# Patient Record
Sex: FEMALE
[De-identification: names, ages, dates, MRNs, and addresses within clinical notes are randomized per-mention and may not be internally consistent; named-entity substitution may affect disease eponyms.]

---

## 2017-07-21 ENCOUNTER — HOSPITAL ENCOUNTER (INPATIENT)
Dept: HOSPITAL 42 - MAMSURG | Age: 67
LOS: 3 days | Discharge: HOME | DRG: 581 | End: 2017-07-24
Attending: GENERAL PRACTICE | Admitting: GENERAL PRACTICE
Payer: MEDICARE

## 2017-07-21 VITALS — BODY MASS INDEX: 44.9 KG/M2

## 2017-07-21 DIAGNOSIS — N60.41: ICD-10-CM

## 2017-07-21 DIAGNOSIS — N64.1: ICD-10-CM

## 2017-07-21 DIAGNOSIS — Z85.3: ICD-10-CM

## 2017-07-21 DIAGNOSIS — E66.9: ICD-10-CM

## 2017-07-21 DIAGNOSIS — G89.18: ICD-10-CM

## 2017-07-21 DIAGNOSIS — I10: ICD-10-CM

## 2017-07-21 DIAGNOSIS — J45.909: ICD-10-CM

## 2017-07-21 DIAGNOSIS — Z90.12: ICD-10-CM

## 2017-07-21 DIAGNOSIS — N60.31: ICD-10-CM

## 2017-07-21 DIAGNOSIS — E80.6: ICD-10-CM

## 2017-07-21 DIAGNOSIS — E11.9: ICD-10-CM

## 2017-07-21 DIAGNOSIS — N60.21: Primary | ICD-10-CM

## 2017-07-21 DIAGNOSIS — N60.91: ICD-10-CM

## 2017-07-21 DIAGNOSIS — Z92.21: ICD-10-CM

## 2017-07-21 DIAGNOSIS — N61.0: ICD-10-CM

## 2017-07-21 DIAGNOSIS — N60.81: ICD-10-CM

## 2017-07-21 DIAGNOSIS — I49.9: ICD-10-CM

## 2017-07-21 LAB
APTT BLD: 28.1 SECONDS (ref 23.7–30.8)
INR PPP: 1.08 (ref 0.93–1.08)
PROTHROMBIN TIME: 11.7 SECONDS (ref 9.9–11.8)

## 2017-07-21 PROCEDURE — 0HTT0ZZ RESECTION OF RIGHT BREAST, OPEN APPROACH: ICD-10-PCS | Performed by: GENERAL PRACTICE

## 2017-07-21 PROCEDURE — 07B50ZX EXCISION OF RIGHT AXILLARY LYMPHATIC, OPEN APPROACH, DIAGNOSTIC: ICD-10-PCS | Performed by: GENERAL PRACTICE

## 2017-07-21 PROCEDURE — BH40ZZZ ULTRASONOGRAPHY OF RIGHT BREAST: ICD-10-PCS | Performed by: GENERAL PRACTICE

## 2017-07-21 NOTE — PCM.SURG1
Surgeon's Initial Post Op Note





- Surgeon's Notes


Surgeon: Dr. Porras


Assistant: Dr. Pedersen PGY3; Dr. Lorenzo PGY2; Francine Bruner OMS3


Type of Anesthesia: General Endo


Anesthesia Administered By: Maggy


Pre-Operative Diagnosis: Right Breast mass


Operative Findings: same


Post-Operative Diagnosis: same


Operation Performed: Right Simple Mastectomy with needle localization.  

Excision of Axillary lymph nodes


Specimen/Specimens Removed: Right Breast


Estimated Blood Loss: EBL {In ML}: 30


Blood Products Given: N/A


Drains Used: Skyler


Post-Op Condition: Good


Date of Surgery/Procedure: 07/21/17


Time of Surgery/Procedure: 14:10

## 2017-07-22 VITALS — RESPIRATION RATE: 20 BRPM

## 2017-07-22 RX ADMIN — INSULIN HUMAN SCH UNITS: 100 INJECTION, SOLUTION PARENTERAL at 01:54

## 2017-07-22 RX ADMIN — INSULIN HUMAN SCH UNITS: 100 INJECTION, SOLUTION PARENTERAL at 17:14

## 2017-07-22 RX ADMIN — INSULIN HUMAN SCH: 100 INJECTION, SOLUTION PARENTERAL at 21:53

## 2017-07-22 RX ADMIN — DILTIAZEM HYDROCHLORIDE SCH MG: 180 CAPSULE, EXTENDED RELEASE ORAL at 09:05

## 2017-07-22 RX ADMIN — INSULIN HUMAN SCH UNITS: 100 INJECTION, SOLUTION PARENTERAL at 12:39

## 2017-07-22 RX ADMIN — INSULIN HUMAN SCH UNITS: 100 INJECTION, SOLUTION PARENTERAL at 07:49

## 2017-07-22 RX ADMIN — HYDROMORPHONE HYDROCHLORIDE PRN MG: 1 INJECTION, SOLUTION INTRAMUSCULAR; INTRAVENOUS; SUBCUTANEOUS at 21:00

## 2017-07-22 RX ADMIN — HYDROMORPHONE HYDROCHLORIDE PRN MG: 1 INJECTION, SOLUTION INTRAMUSCULAR; INTRAVENOUS; SUBCUTANEOUS at 07:40

## 2017-07-22 RX ADMIN — DIGOXIN SCH MG: 0.25 TABLET ORAL at 09:05

## 2017-07-22 NOTE — CP.PCM.PN
<Grant Lorenzo - Last Filed: 07/22/17 12:17>





Subjective





- Date & Time of Evaluation


Date of Evaluation: 07/22/17


Time of Evaluation: 09:10





- Subjective


Subjective: 





SURGERY NOTE FOR DR. PORRAS





67F seen and examined at bedside. Patient complain of pain at site of 

mastectomy. IV pain medication helps with pain. Tolerating diet. 





Objective





- Vital Signs/Intake and Output


Vital Signs (last 24 hours): 


 











Temp Pulse Resp BP Pulse Ox


 


 97.6 F   90   17   152/100 H  87 L


 


 07/22/17 06:00  07/22/17 09:05  07/22/17 06:00  07/22/17 09:05  07/22/17 06:00








Intake and Output: 


 











 07/22/17 07/22/17





 06:59 18:59


 


Intake Total 400 


 


Output Total 275 


 


Balance 125 














- Medications


Medications: 


 Current Medications





Atorvastatin Calcium (Lipitor)  10 mg PO DIN [facility]


   Last Admin: 07/21/17 17:59 Dose:  10 mg


Digoxin (Lanoxin)  0.25 mg PO DAILY [facility]


   Last Admin: 07/22/17 09:05 Dose:  0.25 mg


Diltiazem HCl (Cardizem Cd)  180 mg PO DAILY [facility]


   Last Admin: 07/22/17 09:05 Dose:  180 mg


Famotidine (Pepcid)  20 mg PO DAILY [facility]


   Last Admin: 07/22/17 10:24 Dose:  20 mg


Furosemide (Lasix)  20 mg PO BID [facility]


   Last Admin: 07/22/17 09:05 Dose:  20 mg


Home Med (Home Med)  1 unit PO TUE [facility]


Home Med (Home Med)  1 unit PO DAILY [facility]


   Last Admin: 07/22/17 09:15 Dose:  Not Given


Hydromorphone HCl (Dilaudid)  1 mg SC Q4H PRN


   PRN Reason: Pain, severe (8-10)


   Last Admin: 07/22/17 07:40 Dose:  1 mg


Insulin Human Regular (Humulin R Low)  0 units SC ACHS [facility]


   PRN Reason: Protocol


   Last Admin: 07/22/17 07:49 Dose:  3 units


Losartan Potassium (Cozaar)  100 mg PO DAILY [facility]


   Last Admin: 07/22/17 09:05 Dose:  100 mg


Metformin HCl (Glucophage Xr)  750 mg PO DAILY [facility]


   Last Admin: 07/22/17 09:05 Dose:  750 mg


Non-Formulary Medication (Clonazepam [Clonazepam])  1 mg PO Q6H PRN


   PRN Reason: Anxiety


Non-Formulary Medication (Mometasone/Formoterol [Dulera 200 Mcg/5 Mcg Inhaler])

  1 michelle IH PRN PRN


   PRN Reason: Wheezing


Non-Formulary Medication (Fenofibric Acid (Choline) [Fenofibric Acid])  135 mg 

PO DAILY [facility]


   Last Admin: 07/22/17 09:15 Dose:  Not Given


Ondansetron HCl (Zofran Tab)  4 mg PO Q6H PRN


   PRN Reason: Nausea/Vomiting


   Last Admin: 07/22/17 08:56 Dose:  4 mg











- Labs


Labs: 


 











PT  11.7 Seconds (9.9-11.8)   07/21/17  07:19    


 


INR  1.08  (0.93-1.08)   07/21/17  07:19    


 


APTT  28.1 Seconds (23.7-30.8)   07/21/17  07:19    














- Constitutional


Appears: Non-toxic, No Acute Distress





- Head Exam


Head Exam: ATRAUMATIC





- Respiratory Exam


Respiratory Exam: Clear to Ausculation Bilateral, NORMAL BREATHING PATTERN





- Cardiovascular Exam


Cardiovascular Exam: REGULAR RHYTHM, +S1, +S2





- Neurological Exam


Neurological Exam: Alert, Awake





- Additional Findings


Additional findings: 





breast exam:


R/L mastectomy


s/p right mastectomy POD1


Staples in place - no signs of infection


- YOLANDA drain - 15cc serosang overnight





Assessment and Plan





- Assessment and Plan (Free Text)


Assessment: 





67F Right Mastectomy and lymph node excision POD1


Plan: 





- Pain control


- Monitor incision site


- Monitor YOLANDA output


- CBC/BMP tomorrow


- GI/DVT ppx





Discussed with Dr. Son Lorenzo, PGY2





<Demetrius Porras - Last Filed: 07/24/17 00:40>





Objective





- Vital Signs/Intake and Output


Vital Signs (last 24 hours): 


 











Temp Pulse Resp BP Pulse Ox


 


 98.0 F   70   20   135/76   90 L


 


 07/23/17 16:00  07/23/17 16:00  07/23/17 16:00  07/23/17 17:07  07/23/17 16:00








Intake and Output: 


 











 07/23/17 07/24/17





 18:59 06:59


 


Intake Total 240 480


 


Output Total  500


 


Balance 240 -20














- Medications


Medications: 


 Current Medications





Atorvastatin Calcium (Lipitor)  10 mg PO DIN [facility]


   Last Admin: 07/23/17 17:07 Dose:  10 mg


Digoxin (Lanoxin)  0.25 mg PO DAILY [facility]


   Last Admin: 07/23/17 09:38 Dose:  0.25 mg


Diltiazem HCl (Cardizem Cd)  180 mg PO DAILY [facility]


   Last Admin: 07/23/17 09:37 Dose:  180 mg


Famotidine (Pepcid)  20 mg PO DAILY [facility]


   Last Admin: 07/23/17 09:37 Dose:  20 mg


Furosemide (Lasix)  20 mg PO BID [facility]


   Last Admin: 07/23/17 17:07 Dose:  20 mg


Home Med (Home Med)  1 unit PO TUE [facility]


Home Med (Home Med)  1 unit PO DAILY [facility]


   Last Admin: 07/23/17 12:34 Dose:  Not Given


Hydromorphone HCl (Dilaudid)  1 mg SC Q4H PRN


   PRN Reason: Pain, severe (8-10)


   Last Admin: 07/23/17 13:32 Dose:  1 mg


Insulin Human Regular (Humulin R Low)  0 units SC [facility]


   PRN Reason: Protocol


   Last Admin: 07/23/17 21:29 Dose:  Not Given


Losartan Potassium (Cozaar)  100 mg PO DAILY [facility]


   Last Admin: 07/23/17 09:38 Dose:  100 mg


Metformin HCl (Glucophage Xr)  750 mg PO DAILY [facility]


   Last Admin: 07/23/17 09:37 Dose:  750 mg


Non-Formulary Medication (Clonazepam [Clonazepam])  1 mg PO Q6H PRN


   PRN Reason: Anxiety


Non-Formulary Medication (Mometasone/Formoterol [Dulera 200 Mcg/5 Mcg Inhaler])

  1 michelle IH PRN PRN


   PRN Reason: Wheezing


Non-Formulary Medication (Fenofibric Acid (Choline) [Fenofibric Acid])  135 mg 

PO DAILY [facility]


   Last Admin: 07/23/17 12:34 Dose:  Not Given


Ondansetron HCl (Zofran Tab)  4 mg PO Q6H PRN


   PRN Reason: Nausea/Vomiting


   Last Admin: 07/23/17 13:31 Dose:  4 mg











- Labs


Labs: 


 





 07/23/17 09:03 





 07/23/17 09:03 





 











PT  11.7 Seconds (9.9-11.8)   07/21/17  07:19    


 


INR  1.08  (0.93-1.08)   07/21/17  07:19    


 


APTT  28.1 Seconds (23.7-30.8)   07/21/17  07:19    














Assessment and Plan





- Assessment and Plan (Free Text)


Plan: 


Patient was seen and examined by me. I agree with assessment and plan as per 

resident's note.

## 2017-07-22 NOTE — OP
PROCEDURE DATE:  07/21/2017



PREOPERATIVE DIAGNOSES:

1.  Right breast abnormal lesion.

2.  History of left breast cancer.



POSTOPERATIVE DIAGNOSES:

1.  Right breast abnormal lesion.

2.  History of left breast cancer.



PROCEDURE:  Needle localization of the breast lesion and right modified radical

mastectomy.



SURGEON:  Dr. Demetrius Porras MD



ASSISTANT:  Dr. Pedersen and Dr. Morales.



TYPE OF ANESTHESIA:  General endotracheal anesthesia.



ANESTHESIA ADMINISTERED BY:  Dr. Savage.



ESTIMATED BLOOD LOSS:  Minimal.



SPECIMEN:  Right breast and right axillary content.



INDICATIONS:  The patient is a 67-year-old female who has a history of

extensive breast cancer of left breast several years ago treated with

chemotherapy, but subsequently the patient developed a lesion in the right

breast for which she was going through testing over the past few months. 

Last biopsy showed abnormalities that were suspected for cancer.  Given

these findings and the previous history and the patient's insistence to

have complete mastectomy due to several reasons one of them being to have

any possibility of cancer within it.  The second one was due to the weight

of her breast which was just a right breast after previous left mastectomy

cause her significant back pain and discomfort, therefore a decision was

made to proceed with modified radical mastectomy in order to remove the

entire breast and some of the lymphatic content of the axilla.



DESCRIPTION OF PROCEDURE:  The patient was brought to the operating room,

placed on operating table in supine position.  The patient was connected to

the EKG, blood pressure, and pulse oximetry monitors.  The patient then

underwent general endotracheal anesthesia and was prepped and draped in

usual sterile fashion.



First a standard time-out procedure took place with everybody in the room

agreed as to the patient's identity, diagnoses, and procedure to be

performed.



First the right breast was marked in order to create adequate flap of skin

in order to close the wound.  Once this was done, I then proceeded with

making an incision, first the superior one and flap was raised all the way

up to the clavicle.  The underlying breast tissue was carefully scooped

down along with the pectoralis major muscle.  In the similar fashion the

inferior flap was raised through the alvina's ligament to separate the

breast from the subcutaneous fat and went down all the way to almost the

ridge of the chest.  Once this was done, the breast was completely

mobilized all the way towards the axilla.  Once this was done, I then

proceeded with carrying through the fat incision over the axilla and

carefully dissected out the lymph nodes from the right axilla 

the fatty tissue of the lymph node containing tissue from the axillary

structure and left most of the nerves intact.  The long thoracic nerve was

identified and left intact.  The tissue was now marked and there was one

palpable lymph node within the axilla which was taken separately and sent

for a frozen section which came back as negative for cancer.  The breast

now was removed, highest axillary content was marked with a stitch and sent

for pathology.  Wound was now copiously irrigated with all the bleeding

points were cauterized.  There was excellent hemostasis.  The Skyler drain

was placed into the axilla and under the flaps.  The wound was closed using

3-0 Vicryl deep dermal stitches and surgical staples for skin.  Sterile

dressings were applied to the wound.  The patient tolerated the procedure

well and there were no complications.  The patient was awaken and extubated

and transferred to recovery room for further observations.





__________________________________________

Demetrius Porras MD





DD:  07/21/2017 14:34:23

DT:  07/21/2017 15:12:54

Job # [id number]





PIERO

## 2017-07-23 VITALS — OXYGEN SATURATION: 90 %

## 2017-07-23 LAB
BUN SERPL-MCNC: 20 MG/DL (ref 7–21)
CALCIUM SERPL-MCNC: 8.7 MG/DL (ref 8.4–10.5)
ERYTHROCYTE [DISTWIDTH] IN BLOOD BY AUTOMATED COUNT: 15.6 % (ref 11.5–14.5)
GFR NON-AFRICAN AMERICAN: > 60
HGB BLD-MCNC: 12.4 GM/DL (ref 12–16)
MCH RBC QN AUTO: 30 PG (ref 25–35)
MCHC RBC AUTO-ENTMCNC: 33.2 G/DL (ref 31–37)
MCV RBC AUTO: 90.1 FL (ref 80–105)
PLATELET # BLD: 197 10^3/UL (ref 120–450)
PMV BLD AUTO: 11.3 FL (ref 7–11)
RBC # BLD AUTO: 4.14 10^6/UL (ref 3.5–6.1)
WBC # BLD AUTO: 10.8 10^3/UL (ref 4.5–11)

## 2017-07-23 RX ADMIN — INSULIN HUMAN SCH UNITS: 100 INJECTION, SOLUTION PARENTERAL at 08:38

## 2017-07-23 RX ADMIN — DILTIAZEM HYDROCHLORIDE SCH MG: 180 CAPSULE, EXTENDED RELEASE ORAL at 09:37

## 2017-07-23 RX ADMIN — INSULIN HUMAN SCH UNITS: 100 INJECTION, SOLUTION PARENTERAL at 12:33

## 2017-07-23 RX ADMIN — INSULIN HUMAN SCH UNITS: 100 INJECTION, SOLUTION PARENTERAL at 17:07

## 2017-07-23 RX ADMIN — HYDROMORPHONE HYDROCHLORIDE PRN MG: 1 INJECTION, SOLUTION INTRAMUSCULAR; INTRAVENOUS; SUBCUTANEOUS at 13:32

## 2017-07-23 RX ADMIN — INSULIN HUMAN SCH: 100 INJECTION, SOLUTION PARENTERAL at 21:29

## 2017-07-23 RX ADMIN — DIGOXIN SCH MG: 0.25 TABLET ORAL at 09:38

## 2017-07-23 NOTE — CP.PCM.PN
<Grant Lorenzo - Last Filed: 07/23/17 06:30>





Subjective





- Date & Time of Evaluation


Date of Evaluation: 07/23/17


Time of Evaluation: 05:35





- Subjective


Subjective: 





SURGERY NOTE FOR DR. PORRAS





67F seen and examined at bedside. Patient resting comfortably. NAEON. Patient 

complaining of moderate pain in the right breast. YOLANDA drain in place. Bandage 

with serous fluid stain.





Objective





- Vital Signs/Intake and Output


Vital Signs (last 24 hours): 


 











Temp Pulse Resp BP Pulse Ox


 


 98 F   98 H  20   105/67   93 L


 


 07/22/17 18:00  07/22/17 18:00  07/22/17 18:00  07/22/17 18:00  07/22/17 18:00








Intake and Output: 


 











 07/22/17 07/23/17





 18:59 06:59


 


Intake Total  540


 


Output Total  815


 


Balance  -275














- Medications


Medications: 


 Current Medications





Atorvastatin Calcium (Lipitor)  10 mg PO DIN [facility]


   Last Admin: 07/22/17 17:15 Dose:  10 mg


Digoxin (Lanoxin)  0.25 mg PO DAILY [facility]


   Last Admin: 07/22/17 09:05 Dose:  0.25 mg


Diltiazem HCl (Cardizem Cd)  180 mg PO DAILY [facility]


   Last Admin: 07/22/17 09:05 Dose:  180 mg


Famotidine (Pepcid)  20 mg PO DAILY [facility]


   Last Admin: 07/22/17 10:24 Dose:  20 mg


Furosemide (Lasix)  20 mg PO BID [facility]


   Last Admin: 07/22/17 17:15 Dose:  20 mg


Home Med (Home Med)  1 unit PO TUE [facility]


Home Med (Home Med)  1 unit PO DAILY [facility]


   Last Admin: 07/22/17 09:15 Dose:  Not Given


Hydromorphone HCl (Dilaudid)  1 mg SC Q4H PRN


   PRN Reason: Pain, severe (8-10)


   Last Admin: 07/22/17 21:00 Dose:  1 mg


Insulin Human Regular (Humulin R Low)  0 units SC ACHS [facility]


   PRN Reason: Protocol


   Last Admin: 07/22/17 21:53 Dose:  Not Given


Losartan Potassium (Cozaar)  100 mg PO DAILY [facility]


   Last Admin: 07/22/17 09:05 Dose:  100 mg


Metformin HCl (Glucophage Xr)  750 mg PO DAILY [facility]


   Last Admin: 07/22/17 09:05 Dose:  750 mg


Non-Formulary Medication (Clonazepam [Clonazepam])  1 mg PO Q6H PRN


   PRN Reason: Anxiety


Non-Formulary Medication (Mometasone/Formoterol [Dulera 200 Mcg/5 Mcg Inhaler])

  1 michelle IH PRN PRN


   PRN Reason: Wheezing


Non-Formulary Medication (Fenofibric Acid (Choline) [Fenofibric Acid])  135 mg 

PO DAILY [facility]


   Last Admin: 07/22/17 09:15 Dose:  Not Given


Ondansetron HCl (Zofran Tab)  4 mg PO Q6H PRN


   PRN Reason: Nausea/Vomiting


   Last Admin: 07/22/17 08:56 Dose:  4 mg











- Labs


Labs: 


 











PT  11.7 Seconds (9.9-11.8)   07/21/17  07:19    


 


INR  1.08  (0.93-1.08)   07/21/17  07:19    


 


APTT  28.1 Seconds (23.7-30.8)   07/21/17  07:19    














- Constitutional


Appears: Non-toxic, No Acute Distress





- Respiratory Exam


Respiratory Exam: Clear to Ausculation Bilateral, NORMAL BREATHING PATTERN





- Cardiovascular Exam


Cardiovascular Exam: REGULAR RHYTHM, +S1, +S2





- GI/Abdominal Exam


GI & Abdominal Exam: Soft.  absent: Distended, Firm, Guarding, Rigid, Tenderness

, Rebound





- Neurological Exam


Neurological Exam: Alert, Awake





- Skin


Skin Exam: Dry, Intact, Normal Color, Warm


Additional comments: 





Breast exam- patient tender to touch on right breast. staples in place, serous 

fluid stains on the dressing


- YOLANDA drain - 40 cc serous fluid output.





Assessment and Plan





- Assessment and Plan (Free Text)


Assessment: 





67F Right Mastectomy and lymph node excision POD2


Plan: 





- needs inpatient IV Pain control


- Monitor incision site


- Monitor YOLANDA output


- CBC/BMP this AM


- GI/DVT ppx





Discussed with Dr. Son Lorenzo, PGY2








<Demetrius Porras - Last Filed: 07/24/17 00:41>





Objective





- Vital Signs/Intake and Output


Vital Signs (last 24 hours): 


 











Temp Pulse Resp BP Pulse Ox


 


 98.0 F   70   20   135/76   90 L


 


 07/23/17 16:00  07/23/17 16:00  07/23/17 16:00  07/23/17 17:07  07/23/17 16:00








Intake and Output: 


 











 07/23/17 07/24/17





 18:59 06:59


 


Intake Total 240 480


 


Output Total  500


 


Balance 240 -20














- Medications


Medications: 


 Current Medications





Atorvastatin Calcium (Lipitor)  10 mg PO DIN [facility]


   Last Admin: 07/23/17 17:07 Dose:  10 mg


Digoxin (Lanoxin)  0.25 mg PO DAILY [facility]


   Last Admin: 07/23/17 09:38 Dose:  0.25 mg


Diltiazem HCl (Cardizem Cd)  180 mg PO DAILY [facility]


   Last Admin: 07/23/17 09:37 Dose:  180 mg


Famotidine (Pepcid)  20 mg PO DAILY [facility]


   Last Admin: 07/23/17 09:37 Dose:  20 mg


Furosemide (Lasix)  20 mg PO BID [facility]


   Last Admin: 07/23/17 17:07 Dose:  20 mg


Home Med (Home Med)  1 unit PO TUE [facility]


Home Med (Home Med)  1 unit PO DAILY [facility]


   Last Admin: 07/23/17 12:34 Dose:  Not Given


Hydromorphone HCl (Dilaudid)  1 mg SC Q4H PRN


   PRN Reason: Pain, severe (8-10)


   Last Admin: 07/23/17 13:32 Dose:  1 mg


Insulin Human Regular (Humulin R Low)  0 units SC ACHS [facility]


   PRN Reason: Protocol


   Last Admin: 07/23/17 21:29 Dose:  Not Given


Losartan Potassium (Cozaar)  100 mg PO DAILY [facility]


   Last Admin: 07/23/17 09:38 Dose:  100 mg


Metformin HCl (Glucophage Xr)  750 mg PO DAILY [facility]


   Last Admin: 07/23/17 09:37 Dose:  750 mg


Non-Formulary Medication (Clonazepam [Clonazepam])  1 mg PO Q6H PRN


   PRN Reason: Anxiety


Non-Formulary Medication (Mometasone/Formoterol [Dulera 200 Mcg/5 Mcg Inhaler])

  1 michelle IH PRN PRN


   PRN Reason: Wheezing


Non-Formulary Medication (Fenofibric Acid (Choline) [Fenofibric Acid])  135 mg 

PO DAILY [facility]


   Last Admin: 07/23/17 12:34 Dose:  Not Given


Ondansetron HCl (Zofran Tab)  4 mg PO Q6H PRN


   PRN Reason: Nausea/Vomiting


   Last Admin: 07/23/17 13:31 Dose:  4 mg











- Labs


Labs: 


 





 07/23/17 09:03 





 07/23/17 09:03 





 











PT  11.7 Seconds (9.9-11.8)   07/21/17  07:19    


 


INR  1.08  (0.93-1.08)   07/21/17  07:19    


 


APTT  28.1 Seconds (23.7-30.8)   07/21/17  07:19    














Assessment and Plan





- Assessment and Plan (Free Text)


Plan: 


Patient was seen and examined by me. I agree with assessment and plan as per 

resident's note.

## 2017-07-24 VITALS — HEART RATE: 82 BPM | DIASTOLIC BLOOD PRESSURE: 90 MMHG | SYSTOLIC BLOOD PRESSURE: 140 MMHG

## 2017-07-24 VITALS — TEMPERATURE: 98.1 F | HEART RATE: 98 BPM

## 2017-07-24 RX ADMIN — DILTIAZEM HYDROCHLORIDE SCH MG: 180 CAPSULE, EXTENDED RELEASE ORAL at 10:54

## 2017-07-24 RX ADMIN — DILTIAZEM HYDROCHLORIDE SCH MG: 180 CAPSULE, EXTENDED RELEASE ORAL at 10:55

## 2017-07-24 RX ADMIN — INSULIN HUMAN SCH: 100 INJECTION, SOLUTION PARENTERAL at 08:56

## 2017-07-24 RX ADMIN — INSULIN HUMAN SCH UNITS: 100 INJECTION, SOLUTION PARENTERAL at 12:05

## 2017-07-24 RX ADMIN — DIGOXIN SCH MG: 0.25 TABLET ORAL at 10:57

## 2017-07-24 RX ADMIN — DIGOXIN SCH MG: 0.25 TABLET ORAL at 10:56

## 2017-07-24 NOTE — US
HISTORY:

Patient is a 67-year-old female with history of right breast cancer 

(Biopsy-proven) presenting for right breast needle localization prior 

to bilateral mastectomy.



TECHNIQUE/FINDINGS:  Timeout was called for ultrasound guided wire 

needle localization procedure for lesion of the right breast.



The mass was identified with ultrasound at the 8 o'clock radius 4 cm 

from the nipple at the right breast. Overlying skin was marked for 

procedure.



Maximum sterile barrier protection is provided and the skin overlying 

the lesion in question. 1 percent lidocaine was utilized for local 

anesthesia. A wire was placed through the mass at the right breast 

with ultrasound guidance utilizing a 7.5 cm coaxial system. 

Mediolateral and craniocaudal post deployment views were obtained and 

demonstrated the needle localization wire to be in the mass near the 

previously placed biopsy clip. the wire was properly secured using 

sterile transparent barrier as well as cloth tape. 



Patient tolerated the procedure well with no complications.



Postoperatively, the right breast specimen was not sent for post 

mastectomy imaging.



OTHER FINDINGS:  None. 



IMPRESSION:

Status post successful ultrasound-guided needle localization right 

breast mass. The await definitive pathology report.

## 2017-07-24 NOTE — CP.PCM.DIS
Provider





- Provider


Date of Admission: 


07/21/17 15:45





Attending physician: 


Demetrius Porras MD





Primary care physician: 


Juan Ramon Batista MD





Time Spent in preparation of Discharge (in minutes): 40





Hospital Course





- Lab Results


Lab Results: 


 Most Recent Lab Values











WBC  10.8 10^3/ul (4.5-11.0)  D 07/23/17  09:03    


 


RBC  4.14 10^6/uL (3.5-6.1)   07/23/17  09:03    


 


Hgb  12.4 gm/dL (12.0-16.0)   07/23/17  09:03    


 


Hct  37.3 % (36.0-48.0)   07/23/17  09:03    


 


MCV  90.1 fL (80.0-105.0)   07/23/17  09:03    


 


MCH  30.0 pg (25.0-35.0)   07/23/17  09:03    


 


MCHC  33.2 g/dl (31.0-37.0)   07/23/17  09:03    


 


RDW  15.6 % (11.5-14.5)  H  07/23/17  09:03    


 


Plt Count  197 10^3/uL (120.0-450.0)   07/23/17  09:03    


 


MPV  11.3 fl (7.0-11.0)  H  07/23/17  09:03    


 


PT  11.7 Seconds (9.9-11.8)   07/21/17  07:19    


 


INR  1.08  (0.93-1.08)   07/21/17  07:19    


 


APTT  28.1 Seconds (23.7-30.8)   07/21/17  07:19    


 


Sodium  136 mmol/L (132-148)   07/23/17  09:03    


 


Potassium  4.8 mmol/L (3.6-5.0)   07/23/17  09:03    


 


Chloride  97 mmol/L ()  L  07/23/17  09:03    


 


Carbon Dioxide  30 mmol/L (21-33)   07/23/17  09:03    


 


Anion Gap  14  (10-20)   07/23/17  09:03    


 


BUN  20 mg/dL (7-21)   07/23/17  09:03    


 


Creatinine  0.4 mg/dL (0.5-1.4)  L  07/23/17  09:03    


 


Est GFR ( Amer)  > 60   07/23/17  09:03    


 


Est GFR (Non-Af Amer)  > 60   07/23/17  09:03    


 


POC Glucose (mg/dL)  273 mg/dL ()  H  07/24/17  11:11    


 


Random Glucose  178 mg/dL ()  H  07/23/17  09:03    


 


Calcium  8.7 mg/dL (8.4-10.5)   07/23/17  09:03    














- Hospital Course


Hospital Course: 





67F presented to same day surgery for right breast mastectomy. Operation was 

uneventful. Patient admitted to post op pain that required several days of IV 

pain medication. Pain is now improved and patient ready to go home. Patient to 

get visiting home nurse as per social work. 





Discharge Exam





- Head Exam


Head Exam: ATRAUMATIC





- Respiratory Exam


Respiratory Exam: Clear to PA & Lateral, NORMAL BREATHING PATTERN





- Cardiovascular Exam


Cardiovascular Exam: REGULAR RHYTHM, +S1, +S2





- GI/Abdominal Exam


GI & Abdominal Exam: Soft.  absent: Distended, Firm, Guarding, Rebound, Rigid, 

Tenderness





- Neurological Exam


Neurological exam: Alert, Oriented x3





- Skin


Skin Exam: Dry, Intact, Normal Color, Warm





- Additional Findings


Additional findings: 





right breast staples in place. dressing changed.- no further drainage. Drain is 

serosanguinous. no redness





Discharge Plan





- Follow Up Plan


Condition: GOOD


Disposition: HOME/ ROUTINE


Instructions:  Mastectomy (DC)


Additional Instructions: 


1) Follow up with Dr. Porras upon discharge


2) At home daily dressing changes for a couple daily then leave incision open 

to air


3) Staples will be taken out in the office


4) Daily drainage of diego drain


5) Take prescriptions as directed


Referrals: 


Juan Ramon Batista MD [Primary Care Provider] - 


Demetrius Porras MD [Staff Provider] -

## 2017-07-24 NOTE — MAM
PROCEDURE:  Confirmation mammography post ultrasound-guided right 

needle localization.



HISTORY:

Right breast mass presenting for needle localization for scheduled 

bilateral mastectomy. 



COMPARISON:





TECHNIQUE:

Digital mammography was performed in craniocaudal and mediolateral 

views.



FINDINGS:

Solitary needle localization wires identified placed from an inferior 

approach the stiffener approximating the prior ultrasound-guided 

biopsy clip within in the inferior right breast mass best seen on 

ultrasonography.



IMPRESSION:

Status post cycle also guided deployment of the localization wire 

inferior right breast mass.

## 2017-09-07 ENCOUNTER — HOSPITAL ENCOUNTER (EMERGENCY)
Dept: HOSPITAL 42 - ED | Age: 67
Discharge: HOME | End: 2017-09-07
Payer: MEDICARE

## 2017-09-07 VITALS — SYSTOLIC BLOOD PRESSURE: 128 MMHG | HEART RATE: 86 BPM | DIASTOLIC BLOOD PRESSURE: 69 MMHG

## 2017-09-07 VITALS — HEART RATE: 82 BPM | HEART RATE: 82 BPM

## 2017-09-07 VITALS — RESPIRATION RATE: 16 BRPM | OXYGEN SATURATION: 99 %

## 2017-09-07 VITALS — BODY MASS INDEX: 44.9 KG/M2

## 2017-09-07 VITALS — TEMPERATURE: 98.2 F

## 2017-09-07 DIAGNOSIS — M47.896: ICD-10-CM

## 2017-09-07 DIAGNOSIS — M51.16: Primary | ICD-10-CM

## 2017-09-07 PROCEDURE — 93971 EXTREMITY STUDY: CPT

## 2017-09-07 PROCEDURE — 72131 CT LUMBAR SPINE W/O DYE: CPT

## 2017-09-07 PROCEDURE — 99283 EMERGENCY DEPT VISIT LOW MDM: CPT

## 2017-09-07 PROCEDURE — 96372 THER/PROPH/DIAG INJ SC/IM: CPT

## 2017-09-07 NOTE — ED PDOC
Arrival/HPI





- General


Chief Complaint: Back Pain


Time Seen by Provider: 09/07/17 12:59


Historian: Patient





- History of Present Illness


Narrative History of Present Illness (Text): 





09/07/17 12:59


68 y/o female, pmh including htn/a.fibb/dm/cholecystitis, nkda, c/o rt. lower 

back pain radiating to the rt. lower extremity for the past 4 days with no fall 

or trauma.  Aching pain, aggravated by walking and movement, no numbness or 

tingling, no palpitation, no rash, no night sweat, no dizziness, no change in 

vision, no urinary symptoms.  Pt. is on the eliquis daily for the A.fibb. 





Past Medical History





- Provider Review


Nursing Documentation Reviewed: Yes





- Infectious Disease


Hx of Infectious Diseases: None





- Tetanus Immunization


Tetanus Immunization: Unknown





- Cardiac


Hx Cardiac Disorders: Yes


Hx Cardiac Arrhythmia: Yes (AFIB)


Hx Hypertension: Yes


Hx Pacemaker: No


Hx Peripheral Edema: Yes





- Pulmonary


Hx Respiratory Disorders: Yes (SMOKED CIGARETTES QUIT 20 YRS AGO.)


Hx Asthma: Yes


Hx Chronic Obstructive Pulmonary Disease (COPD): Yes


Hx Sleep Apnea: Yes (CPAP)


Other/Comment: FORMER SMOKER





- Neurological


Hx Neurological Disorder: No





- HEENT


Hx HEENT Disorder: No





- Renal


Hx Renal Disorder: No





- Endocrine/Metabolic


Hx Endocrine Disorders: Yes


Hx Hyperthyroidism: Yes





- Hematological/Oncological


Hx Blood Disorders: Yes


Hx Cancer: Yes (b/l breast cancer)


Hx Chemotherapy: Yes (AND RADIATION TO L BREAT COMPLETED)





- Integumentary


Hx Dermatological Disorder: Yes


Other/Comment: TOTAL MASTECTOMY BILATERAL BREAST. ONE IN 2005 AND ONE 7-21-17





- Musculoskeletal/Rheumatological


Hx Musculoskeletal Disorders: Yes


Hx Falls: No


Hx Unsteady Gait: Yes





- Gastrointestinal


Hx Gastrointestinal Disorders: Yes (LAP BAND SX)


Hx Gall Bladder Disease: Yes (CHOLECYSTECTOMY)


Hx Gastroesophageal Reflux: Yes





- Genitourinary/Gynecological


Hx Genitourinary Disorders: No





- Psychiatric


Hx Psychophysiologic Disorder: No


Hx Emotional Abuse: No


Hx Physical Abuse: No


Hx Substance Use: No





- Surgical History


Hx Cholecystectomy: Yes


Other/Comment: POST BILATERAL TOTAL MASTECTOMY. LEFT 2005,RIGHT 7-21-17.





- Anesthesia


Hx Anesthesia Reactions: No


Hx Malignant Hyperthermia: No





- Suicidal Assessment


Feels Threatened In Home Enviroment: No





Family/Social History





- Physician Review


Nursing Documentation Reviewed: Yes


Family/Social History: Unknown Family HX


Smoking Status: Former Smoker


Hx Alcohol Use: No


Hx Substance Use: No


Hx Substance Use Treatment: No





Allergies/Home Meds


Allergies/Adverse Reactions: 


Allergies





No Known Allergies Allergy (Verified 09/07/17 12:46)


 











Review of Systems





- Review of Systems


Constitutional: absent: Fatigue, Fevers


Eyes: absent: Vision Changes


ENT: absent: Hearing Changes


Respiratory: absent: SOB, Cough, Sputum


Cardiovascular: absent: Chest Pain


Gastrointestinal: absent: Abdominal Pain


Genitourinary Female: absent: Dysuria, Frequency


Musculoskeletal: Back Pain, Myalgias.  absent: Arthralgias, Neck Pain, Joint 

Swelling


Skin: absent: Rash


Neurological: absent: Headache, Dizziness





Physical Exam


Vital Signs Reviewed: Yes


Vital Signs











  Temp Pulse Resp BP Pulse Ox


 


 09/07/17 14:03   86  18  128/69  98


 


 09/07/17 12:54   94 H  18  130/75  98


 


 09/07/17 12:39  98.2 F  100 H  18  132/83  96











Temperature: Afebrile


Blood Pressure: Normal


Pulse: Regular


Respiratory Rate: Normal


Appearance: Positive for: Well-Appearing, Non-Toxic


Pain Distress: Severe


Mental Status: Positive for: Alert and Oriented X 3





- Systems Exam


Head: Present: Atraumatic, Normocephalic


Pupils: Present: PERRL


Extroacular Muscles: Present: EOMI


Conjunctiva: Present: Normal


Mouth: Present: Moist Mucous Membranes


Neck: Present: Normal Range of Motion


Respiratory/Chest: Present: Clear to Auscultation, Good Air Exchange.  No: 

Respiratory Distress, Accessory Muscle Use


Cardiovascular: Present: Regular Rate and Rhythm, Normal S1, S2.  No: Murmurs


Abdomen: Present: Normal Bowel Sounds.  No: Tenderness, Distention, Peritoneal 

Signs


Back: Present: Normal Inspection, Paraspinal Tenderness, Other (LS spine: +ttp 

on the rt. parapinal muscle region, no midline tenderness or step off, no rash, 

FROM without limitation with pain, sensation intact, motor 5/5, no saddling 

gait. ).  No: CVA Tenderness, Midline Tenderness, Decubitus Ulcer


Upper Extremity: Present: Normal Inspection.  No: Cyanosis, Edema


Lower Extremity: Present: Normal Inspection.  No: Edema


Neurological: Present: GCS=15, Speech Normal, Motor Func Grossly Intact, Gait 

Normal, Memory Normal


Skin: Present: Warm, Dry, Normal Color.  No: Rashes


Psychiatric: Present: Alert, Oriented x 3, Normal Insight, Normal Concentration





Medical Decision Making


ED Course and Treatment: 





09/07/17 13:19


-CT


-RLE Venuous Doppler


-Morphine/valium


-I checked the [facility], pt. has multiple tramadol prescriptions so likely wouldn't 

prescribe any controlled substances from the ER. 





09/07/17 14:36


-CT LS spine: Multilevel degenerative disc disease. Possible extruded disc at L2

-3 superimposed upon disc bulge.  Disc bulge at L1-2 and L3-4.  Right 

parasagittal disc herniation at L4-5.  Disc bulge at L5-S1.  Multilevel central 

spinal stenosis and bilateral neural foraminal stenosis as described. 

Multilevel degenerative facet arthropathy. Lumbar levoscoliosis. Examination is 

somewhat limited due to patient body habitus.


-RLE Venuous doppler show no DVT. 


-I discussed all labs/radiology results with the patient.


-Pt. feels much better now, decadron 10mg IM ordered with the cane


-Discharge home with lidoderm patch, cane, continue tramadol at home as needed, 

follow up with you our own pmd and pain management within 2 days, return to the 

ER for any new or worsening signs or symptoms. 





- RAD Interpretation


Radiology Orders: 








09/07/17 13:06


LUMBAR SPINE W/O CONTRAST [CT] Stat 


DUPLEX LOWER EXTRM VEIN RIGHT [US] Stat 











-RLE Venuous Doppler: as per preliminary report, no acute DVT


-CT Lumbar spine: 


PROCEDURE:  CT Lumbar Spine without contrast





HISTORY:


lower back pain radiating to RLE





COMPARISON:


None.





TECHNIQUE:


Axial computed tomography images were obtained of the lumbar spine without the 

use of intravenous contrast. Coronal and sagittal reformatted images were 

created and reviewed. 





Radiation dose:





Total exam DLP = 951.95 mGy-cm.





This CT exam was performed using one or more of the following dose reduction 

techniques: Automated exposure control, adjustment of the mA and/or kV 

according to patient size, and/or use of iterative reconstruction technique.





FINDINGS:





VERTEBRAE:


The vertebral bodies are maintained in height. The transverse processes and 

posterior elements are intact. Normal vertebral alignment is maintained. 





DISCS/SPINAL CANAL/NEURAL FORAMINA:


L1-2:    Narrowed intervertebral disc space. Marginal osteophytes. Diffuse disc 

bulge.  Spinal stenosis. Severe right and moderate left neural foraminal 

stenosis.





L2-3: Narrowed intervertebral disc space. Possible extruded disc superimposed 

upon disc bulge. This is a midline disc extrusion extending along the posterior 

aspect of the L3 vertebral body. Visualization is somewhat limited.  Consider 

evaluation with magnetic resonance imaging. Severe right and moderate left 

neural foraminal stenosis. There is central spinal stenosis.





L3-4: Narrowed intervertebral disc space. Diffuse disc bulge. Moderate central 

spinal stenosis. Moderate left and severe right neural foraminal stenosis. 

Bilateral degenerative facet arthropathy, right greater than left. 





L4-5:    Narrowed intervertebral disc space. Right parasagittal disc 

herniation. Central spinal stenosis. Severe bilateral neural foraminal 

stenosis. Bilateral degenerative facet arthropathy. 





L5-S1:  Mild disc bulge. Bilateral degenerative facet arthropathy. Large 

osteophyte left foraminal and lateral aspect of L5 vertebra resulting in severe 

left neural foraminal stenosis.  There is moderate to severe right neural 

foraminal stenosis. There is no central spinal stenosis.





PARASPINAL SOFT TISSUES:


Unremarkable. 





OTHER FINDINGS:


Lumbar levoscoliosis. 





IMPRESSION:


Multilevel degenerative disc disease. Possible extruded disc at L2-3 

superimposed upon disc bulge.  Disc bulge at L1-2 and L3-4.  Right parasagittal 

disc herniation at L4-5.  Disc bulge at L5-S1.  Multilevel central spinal 

stenosis and bilateral neural foraminal stenosis as described. Multilevel 

degenerative facet arthropathy. Lumbar levoscoliosis. Examination is somewhat 

limited due to patient body habitus.





: Radiologist





- Medication Orders


Current Medication Orders: 











Discontinued Medications





Diazepam (Valium)  5 mg PO ONCE ONE


   PRN Reason: Protocol


   Stop: 09/07/17 13:07


   Last Admin: 09/07/17 13:14  Dose: 5 mg





Morphine Sulfate (Morphine)  4 mg IM STAT STA


   Stop: 09/07/17 13:07


   Last Admin: 09/07/17 13:14  Dose: 4 mg











- PA / NP / Resident Statement


MD/DO has reviewed & agrees with the documentation as recorded.





Disposition/Present on Arrival





- Present on Arrival


Any Indicators Present on Arrival: No


History of DVT/PE: No


History of Uncontrolled Diabetes: No


Urinary Catheter: No


History of Decub. Ulcer: No


History Surgical Site Infection Following: None





- Disposition


Have Diagnosis and Disposition been Completed?: Yes


Diagnosis: 


 Lumbar disc herniation with radiculopathy, Degenerative joint disease (DJD) of 

lumbar spine





Disposition: HOME/ ROUTINE


Disposition Time: 13:20


Patient Plan: Discharge


Condition: IMPROVED


Additional Instructions: 


-Discharge home with lidoderm patch, cane, continue tramadol at home as needed, 

follow up with you our own pmd and pain management within 2 days, return to the 

ER for any new or worsening signs or symptoms. 


Prescriptions: 


Lidocaine 5% [Lidoderm] 1 patch TOP DAILY PRN #14 patch


 PRN Reason: Other


Referrals: 


Jovany Varela MD [IM] - Follow up with primary


Eric Espinosa III, MD [Medical Doctor] - Follow up with primary


Forms:  [employer] (English), WORK NOTE

## 2017-09-07 NOTE — US
PROCEDURE:  Right lower extremity venous US 



HISTORY:

Leg pain and swelling. Evaluate for DVT.



PHYSICIAN(S):  Ezio Tomlinson M.D.



TECHNIQUE:

Duplex sonography and color-flow Doppler with graded compression were 

used to evaluate the deep venous system of the right lower extremity. 



FINDINGS:

The visualized deep venous system of the right lower extremity is 

sonographically normal and compressible. Normal waveforms and 

augmentation are seen. There is no sonographic evidence for deep 

venous thrombosis in the visualized segments of the right lower 

extremity.



IMPRESSION:

1. No sonographic evidence for deep venous thrombosis in the 

visualized segments of the right lower extremity.

## 2017-09-07 NOTE — CT
PROCEDURE:  CT Lumbar Spine without contrast



HISTORY:

lower back pain radiating to RLE



COMPARISON:

None.



TECHNIQUE:

Axial computed tomography images were obtained of the lumbar spine 

without the use of intravenous contrast. Coronal and sagittal 

reformatted images were created and reviewed. 



Radiation dose:



Total exam DLP = 951.95 mGy-cm.



This CT exam was performed using one or more of the following dose 

reduction techniques: Automated exposure control, adjustment of the 

mA and/or kV according to patient size, and/or use of iterative 

reconstruction technique.



FINDINGS:



VERTEBRAE:

The vertebral bodies are maintained in height. The transverse 

processes and posterior elements are intact. Normal vertebral 

alignment is maintained. 



DISCS/SPINAL CANAL/NEURAL FORAMINA:

L1-2:    Narrowed intervertebral disc space. Marginal osteophytes. 

Diffuse disc bulge.  Spinal stenosis. Severe right and moderate left 

neural foraminal stenosis.



L2-3: Narrowed intervertebral disc space. Possible extruded disc 

superimposed upon disc bulge. This is a midline disc extrusion 

extending along the posterior aspect of the L3 vertebral body. 

Visualization is somewhat limited.  Consider evaluation with magnetic 

resonance imaging. Severe right and moderate left neural foraminal 

stenosis. There is central spinal stenosis.



L3-4: Narrowed intervertebral disc space. Diffuse disc bulge. 

Moderate central spinal stenosis. Moderate left and severe right 

neural foraminal stenosis. Bilateral degenerative facet arthropathy, 

right greater than left. 



L4-5:    Narrowed intervertebral disc space. Right parasagittal disc 

herniation. Central spinal stenosis. Severe bilateral neural 

foraminal stenosis. Bilateral degenerative facet arthropathy. 



L5-S1:  Mild disc bulge. Bilateral degenerative facet arthropathy. 

Large osteophyte left foraminal and lateral aspect of L5 vertebra 

resulting in severe left neural foraminal stenosis.  There is 

moderate to severe right neural foraminal stenosis. There is no 

central spinal stenosis.



PARASPINAL SOFT TISSUES:

Unremarkable. 



OTHER FINDINGS:

Lumbar levoscoliosis. 



IMPRESSION:

Multilevel degenerative disc disease. Possible extruded disc at L2-3 

superimposed upon disc bulge.  Disc bulge at L1-2 and L3-4.  Right 

parasagittal disc herniation at L4-5.  Disc bulge at L5-S1.  

Multilevel central spinal stenosis and bilateral neural foraminal 

stenosis as described. Multilevel degenerative facet arthropathy. 

Lumbar levoscoliosis. Examination is somewhat limited due to patient 

body habitus.

## 2018-05-17 ENCOUNTER — HOSPITAL ENCOUNTER (OUTPATIENT)
Dept: HOSPITAL 42 - CATH | Age: 68
Discharge: HOME | End: 2018-05-17
Payer: MEDICARE

## 2018-05-17 VITALS — SYSTOLIC BLOOD PRESSURE: 126 MMHG | HEART RATE: 92 BPM | OXYGEN SATURATION: 94 % | DIASTOLIC BLOOD PRESSURE: 57 MMHG

## 2018-05-17 VITALS — RESPIRATION RATE: 18 BRPM | TEMPERATURE: 98.2 F

## 2018-05-17 VITALS — BODY MASS INDEX: 45.7 KG/M2

## 2018-05-17 DIAGNOSIS — I48.2: ICD-10-CM

## 2018-05-17 DIAGNOSIS — E11.51: ICD-10-CM

## 2018-05-17 DIAGNOSIS — I27.20: ICD-10-CM

## 2018-05-17 DIAGNOSIS — Z90.13: ICD-10-CM

## 2018-05-17 DIAGNOSIS — Z79.899: ICD-10-CM

## 2018-05-17 DIAGNOSIS — G47.33: ICD-10-CM

## 2018-05-17 DIAGNOSIS — Z79.84: ICD-10-CM

## 2018-05-17 DIAGNOSIS — E66.01: ICD-10-CM

## 2018-05-17 DIAGNOSIS — I10: ICD-10-CM

## 2018-05-17 DIAGNOSIS — Z79.01: ICD-10-CM

## 2018-05-17 DIAGNOSIS — I25.750: Primary | ICD-10-CM

## 2018-05-17 DIAGNOSIS — Z87.891: ICD-10-CM

## 2018-05-17 DIAGNOSIS — E78.5: ICD-10-CM

## 2018-05-17 DIAGNOSIS — Z85.3: ICD-10-CM

## 2018-05-17 LAB
APTT BLD: 27.8 SECONDS (ref 25.1–36.5)
BASOPHILS # BLD AUTO: 0.03 K/MM3 (ref 0–2)
BASOPHILS NFR BLD: 0.3 % (ref 0–3)
BUN SERPL-MCNC: 20 MG/DL (ref 7–21)
CALCIUM SERPL-MCNC: 9.9 MG/DL (ref 8.4–10.5)
EOSINOPHIL # BLD: 0.3 10*3/UL (ref 0–0.7)
EOSINOPHIL NFR BLD: 3.2 % (ref 1.5–5)
ERYTHROCYTE [DISTWIDTH] IN BLOOD BY AUTOMATED COUNT: 15.8 % (ref 11.5–14.5)
GFR NON-AFRICAN AMERICAN: > 60
GRANULOCYTES # BLD: 6.02 10*3/UL (ref 1.4–6.5)
GRANULOCYTES NFR BLD: 61.4 % (ref 50–68)
HDLC SERPL-MCNC: 41 MG/DL (ref 29–60)
HGB BLD-MCNC: 13.7 G/DL (ref 12–16)
INR PPP: 1.01 (ref 0.93–1.08)
LDLC SERPL-MCNC: 109 MG/DL (ref 0–129)
LYMPHOCYTES # BLD: 2.7 10*3/UL (ref 1.2–3.4)
LYMPHOCYTES NFR BLD AUTO: 27.8 % (ref 22–35)
MCH RBC QN AUTO: 29.8 PG (ref 25–35)
MCHC RBC AUTO-ENTMCNC: 33.4 G/DL (ref 31–37)
MCV RBC AUTO: 89.3 FL (ref 80–105)
MONOCYTES # BLD AUTO: 0.7 10*3/UL (ref 0.1–0.6)
MONOCYTES NFR BLD: 7.3 % (ref 1–6)
PLATELET # BLD: 241 10^3/UL (ref 120–450)
PMV BLD AUTO: 10.5 FL (ref 7–11)
PROTHROMBIN TIME: 11.6 SECONDS (ref 9.4–12.5)
RBC # BLD AUTO: 4.59 10^6/UL (ref 3.5–6.1)
WBC # BLD AUTO: 9.8 10^3/UL (ref 4.5–11)

## 2018-05-17 PROCEDURE — 85730 THROMBOPLASTIN TIME PARTIAL: CPT

## 2018-05-17 PROCEDURE — 86900 BLOOD TYPING SEROLOGIC ABO: CPT

## 2018-05-17 PROCEDURE — 99153 MOD SED SAME PHYS/QHP EA: CPT

## 2018-05-17 PROCEDURE — 85610 PROTHROMBIN TIME: CPT

## 2018-05-17 PROCEDURE — 93460 R&L HRT ART/VENTRICLE ANGIO: CPT

## 2018-05-17 PROCEDURE — 93005 ELECTROCARDIOGRAM TRACING: CPT

## 2018-05-17 PROCEDURE — 80048 BASIC METABOLIC PNL TOTAL CA: CPT

## 2018-05-17 PROCEDURE — 36415 COLL VENOUS BLD VENIPUNCTURE: CPT

## 2018-05-17 PROCEDURE — 85025 COMPLETE CBC W/AUTO DIFF WBC: CPT

## 2018-05-17 PROCEDURE — 86850 RBC ANTIBODY SCREEN: CPT

## 2018-05-17 PROCEDURE — 99152 MOD SED SAME PHYS/QHP 5/>YRS: CPT

## 2018-05-17 PROCEDURE — 80061 LIPID PANEL: CPT

## 2018-05-17 NOTE — HP
DATE OF EXAM:  05/16/2018



REASON FOR ADMISSION:  Left and right heart catheterization, possible

angioplasty.



BRIEF CLINICAL HISTORY:  This is a 67-year-old morbidly obese female with

history of hypertension, diabetes, obesity, chronic atrial fibrillation, on

Eliquis, pulmonary hypertension, complaining of chest pain and feels

heaviness in the heart, and explains something sitting very tight in the

chest especially when walks.  Patient states that stress test was done by

Dr. Miller and is negative, and does not want to do anything, but patient

is still complaining of chest pain.  Also patient deferred for right heart

catheterization by Dr. Felder and Dr. Juan Ramon Batista, and Dr. Charles.



PAST MEDICAL HISTORY:  Significant for pulmonary hypertension, morbid

obesity, diabetes, hypertension, hyperlipidemia, chronic atrial

fibrillation.



CURRENT MEDICATIONS:  Patient is taking acetaminophen 325 mg every 6

hours p.r.n.; atorvastatin 40 mg daily; Eliquis 2.5 mg b.i.d., last Eliquis

in 2014; digoxin 0.125; montelukast/Singulair, Pepcid, omeprazole,

metformin, Cardizem, losartan, and Lexapro.



ALLERGIES:  NO KNOWN DRUG ALLERGIES.



RECENT CARDIAC WORKUP:  As follows; patient had echocardiogram which shows

ejection fraction 59%, right ventricular systolic pressure 53, moderate

pulmonary hypertension.  Patient had stress test Lexiscan done on

02/25/2018, which is negative.



REVIEW OF SYSTEMS:  As per HPI.



PHYSICAL EXAMINATION:

VITAL SIGNS:  Height of the patient is 5 feet, weight of the patient is 234

pounds, body mass index 46 kg/m2.  Rest of the examination is as follows;

heart rate 80, blood pressure 134/86.

HEENT:  PERRLA.  Extraocular muscles are intact.

NECK:  Supple.  No carotid bruit or thyromegaly.

CHEST:  Clear to auscultation.

HEART:  S1 and S2 regular.

ABDOMEN:  Soft.

EXTREMITIES:  Clubbing and cyanosis negative.



IMPRESSION:  Recurrent chest pain, though stress test was normal, possibly

false normal.  Obesity, hypertension, hyperlipidemia, pulmonary

hypertension, dyspnea on exertion.  Last echocardiogram showed ejection

fraction 59% with right ventricular systolic pressure of 53.  History of

bilateral breast cancer, status post mastectomy, on oral pills for cancer.



RECOMMENDATIONS:  Left and right heart catheterization.  Further

recommendations after cardiac catheterization.  Last Eliquis dose was on

05/14/2018.  We will follow with you.



Thank you Dr. Loyola/Dr. Juan Ramon Batista for providing us the

opportunity in taking care of the patient, Sylwia Stephenson.





__________________________________________

Beed MD Castro



DD:  05/16/2018 16:35:22

DT:  05/16/2018 21:53:52

Job # [id number]

PIERO

## 2018-05-17 NOTE — CPOSTOP
DATE:  05/17/2018



CARDIOVASCULAR CATH LAB PROCEDURE POSTPROCEDURE NOTE



DICTATING PHYSICIAN:  Gagan Erazo MD.



SCRUB TECHNICIAN:  Tomi Martinez, cardiovascular technician.



TYPE OF ANESTHESIA:  Moderate conscious sedation.  Total dose given 3 mg of

Versed, 100 of fentanyl.  Periodically started 1 mg of Versed, 50 of

Fentanyl.



PROCEDURE PERFORMED:  Complete left and right heart catheterization.



FINDINGS:  Mild nonobstructive coronary artery disease, pulmonary

hypertension.



FINAL DIAGNOSES:  Mild nonspecific coronary artery disease, pulmonary

hypertension.



POST PROCEDURE CONDITION:  Postprocedure, the patient's condition is

stable.



VASCULAR ACCESS SITE:  Right femoral groin.



CLOSURE DEVICE:  Angio-Seal on right femoral artery.  Mynx on right femoral

vein.



TOTAL RADIATION DOSE:  7251.4 krishna  [facility] Unit.



FLUORO TIME:  3.4 minutes.





__________________________________________

Gagan Erazo MD



DD:  05/17/2018 9:50:06

DT:  05/17/2018 10:06:43

Job # [id number]

[email or web address]

## 2019-03-25 NOTE — ED PDOC
Arrival/HPI





- General


Chief Complaint: Shortness Of Breath


Time Seen by Provider: 03/25/19 13:18


Historian: Patient





- Critical Care


Critical Care Minutes: 45 minutes





- History of Present Illness


Narrative History of Present Illness (Text): 





03/25/19 13:34





69 y/o female with pmhx of A-fib, pulmonary hypertension, Asthma, sleep apnea, 

breast CA s/p mastectomy (2005,2017), who presents to the ED for shortness of 

breath since yesterday. Patient expresses difficulty speaking in full sentences 

secondary to shortness of breath. Patient states taking her asthma medication 

but denies taking any nebulizer treatments at home. Patient reports past 

admission for similar asthma exacerbation. Patient additionally states recent 

completion of antibiotics for viral URI illness. Patient denies chest pain, 

headache, dizziness, neck pain, or any other complaints. Also, patient has no 

history of intubation.





PMD: Dr. Batista


Oncologist: Dr. Charles





Time/Duration: 24 hours


Symptom Onset: Gradual


Symptom Course: Worsening


Context: Home





Past Medical History





- Provider Review


Nursing Documentation Reviewed: Yes





- Infectious Disease


Hx of Infectious Diseases: None





- Tetanus Immunization


Tetanus Immunization: Unknown





- Cardiac


Hx Congestive Heart Failure: Yes


Hx Pacemaker: No





- Pulmonary


Hx Respiratory Disorders: Yes (SMOKED CIGARETTES QUIT 20 YRS AGO.)


Hx Asthma: Yes


Hx Chronic Obstructive Pulmonary Disease (COPD): Yes


Hx Sleep Apnea: Yes (CPAP)


Other/Comment: FORMER SMOKER





- Neurological


Hx Paralysis: No





- HEENT


Hx HEENT Disorder: No





- Renal


Hx Renal Disorder: No





- Endocrine/Metabolic


Hx Endocrine Disorders: Yes


Hx Hyperthyroidism: Yes





- Hematological/Oncological


Hx Blood Transfusions: No


Hx Blood Transfusion Reaction:  (NA)





- Integumentary


Hx Dermatological Disorder: Yes


Other/Comment: TOTAL MASTECTOMY BILATERAL BREAST. ONE IN 2005 AND ONE 7-21-17





- Musculoskeletal/Rheumatological


Hx Musculoskeletal Disorders: Yes





- Gastrointestinal


Hx Gastrointestinal Disorders: Yes (LAP BAND SX)


Hx Gall Bladder Disease: Yes (CHOLECYSTECTOMY)


Hx Gastroesophageal Reflux: Yes





- Genitourinary/Gynecological


Hx Genitourinary Disorders: No





- Psychiatric


Hx Emotional Abuse: No


Hx Physical Abuse: No


Hx Substance Use: No





- Surgical History


Hx Cholecystectomy: Yes


Other/Comment: POST BILATERAL TOTAL MASTECTOMY. LEFT 2005,RIGHT 7-21-17.





- Anesthesia


Hx Anesthesia Reactions: No


Hx Malignant Hyperthermia: No





- Suicidal Assessment


Feels Threatened In Home Enviroment: No





Family/Social History





- Physician Review


Nursing Documentation Reviewed: Yes


Family/Social History: Unknown Family HX


Smoking Status: Former Smoker


Hx Alcohol Use: No


Hx Substance Use: No


Hx Substance Use Treatment: No





Allergies/Home Meds


Allergies/Adverse Reactions: 


Allergies





No Known Allergies Allergy (Verified 03/25/19 14:08)


   








Home Medications: 


                                    Home Meds











 Medication  Instructions  Recorded  Confirmed


 


Acetaminophen [Tylenol 325mg tab] 325 mg PO PRN PRN 05/14/18 05/17/18


 


Acetaminophen/Butalbital/Caf 1 tab PO PRN PRN 05/14/18 05/17/18





[Fioricet]   


 


Apixaban [Eliquis] 2.5 mg PO BID 05/14/18 05/17/18


 


Atorvastatin [Lipitor] 40 mg PO HS 05/14/18 05/17/18


 


Digoxin [Lanoxin] 0.125 mg PO QAM 05/14/18 05/17/18


 


Dulaglutide [Trulicity] 1.5 mg SC MON 05/14/18 05/17/18


 


Ergocalciferol (Vitamin D2) 50,000 unit PO TUE 05/14/18 05/14/18





[Vitamin D2]   


 


Escitalopram [Lexapro] 10 mg PO QAM 05/14/18 05/17/18


 


Esomeprazole Magnesium [Nexium] 40 mg PO QAM 05/14/18 05/17/18


 


Famotidine [Pepcid] 20 mg PO DAILY 05/14/18 05/17/18


 


Levocetirizine Dihydrochloride 5 mg PO DAILY PRN 05/14/18 05/17/18





[Xyzal]   


 


Losartan [Cozaar] 100 mg PO QAM 05/14/18 05/17/18


 


Montelukast [Singulair] 10 mg PO HS 05/14/18 05/17/18


 


Omeprazole 40 mg PO HS 05/14/18 05/17/18


 


Vitamins 50+ Packet 1 packet PO DAILY 05/14/18 05/17/18


 


diltiaZEM CD [Cardizem CD] 180 mg PO BID 05/14/18 05/17/18


 


MetFORMIN ER [Glucophage XR] 750 mg PO DAILY 05/17/18 05/17/18


 


Ambrisentan [Letairis] 5 mg PO 09/20/18 


 


Fluticasone/Umeclidin/Vilanter 1 each IH 09/20/18 





[Trelegy Ellipta 100-62.5-25]   


 


GlipiZIDE [Glucotrol] 5 mg PO TID 09/20/18 09/20/18


 


clonazePAM [Klonopin] 0.5 mg PO 09/20/18 














Review of Systems





- Physician Review


All systems were reviewed & negative as marked: Yes





- Review of Systems


Respiratory: SOB


Cardiovascular: absent: Chest Pain


Neurological: absent: Headache





Physical Exam





- Physical Exam


Narrative Physical Exam (Text): 





03/25/19 13:46


Constitutional: Distress secondary to shortness of breath.


Head: Normocephalic.  Atraumatic.  


Eyes:  PERRL.


ENT:  Moist mucous membranes.


Neck:  Supple.


Cardiovascular:  Regular rate.


Chest: No tenderness.


Respiratory:  Tachypneic, speaking in short sentences. Clear to auscultation 

bilaterally.


GI:  Soft. Nontender. Nondistended. 


Back:  No CVA tenderness.


Musculoskeletal:  No tenderness. Bilateral pitting edema.


Skin:  No rash. 


Neurologic:  Alert, no focal deficit. 








Vital Signs Reviewed: Yes


Temperature: Afebrile


Blood Pressure: Normal


Pulse: Regular


Respiratory Rate: Tachypneic


Appearance: Positive for: Well-Appearing, Non-Toxic, Uncomfortable


Pain Distress: Moderate


Mental Status: Positive for: Alert and Oriented X 3





Medical Decision Making


ED Course and Treatment: 





03/25/19 13:49


Impression: 


68 year old female who presents to the ED c/o shortness of breath.





Plan:


-- ABG


-- EKG


--Labs


--Chest X-ray


--Duoneb


--Solu-Medrol 


-- Reassess and disposition





Prior Visits:


Notes and results from previous visits were reviewed. 





Progress Notes:





03/25/19 13:52


EKG Reviewed, shows A-fib @ 127 bpm, no ST/T wave changes. 





03/25/19 15:28


CXR reviewed, shows no active disease. No significant interval changes compared 

to prior examinations.





03/25/19 15:38


Discussed case with Dr. Baldwin. who is aware and agrees with ED management 

plan, accepts patient under her service. Requests CTA.





- Critical Care


Critical Care Minutes: 45 minutes





- RAD Interpretation


Radiology Orders: 











03/25/19 13:30


CHEST PORTABLE [RAD] Stat 











: Radiologist





- Medication Orders


Current Medication Orders: 











Albuterol/Ipratropium (Duoneb 3 Mg/0.5 Mg (3 Ml) Ud)  3 ml IH Q15MIN THU


   Stop: 03/27/19 13:31


Methylprednisolone (Solu-Medrol)  125 mg IVP STAT STA


   Stop: 03/25/19 13:31











- Scribe Statement


The provider has reviewed the documentation as recorded by the Davin oconnor with [facility].





All medical record entries made by the Susanibe were at my direction and 

personally dictated by me. I have reviewed the chart and agree that the record 

accurately reflects my personal performance of the history, physical exam, 

medical decision making, and the department course for this patient. I have also

 personally directed, reviewed, and agree with the discharge instructions and 

disposition.





Disposition/Present on Arrival





- Present on Arrival


Any Indicators Present on Arrival: No


History of DVT/PE: No


History of Uncontrolled Diabetes: No


Urinary Catheter: No


History Surgical Site Infection Following: None





- Disposition


Have Diagnosis and Disposition been Completed?: Yes


Diagnosis: 


 Asthma exacerbation, Pulmonary hypertension





Disposition: HOSPITALIZED


Disposition Time: 15:45


Patient Plan: Admission, Telemetry


Condition: GUARDED


Referrals: 


Juan Ramon Btaista MD [Primary Care Provider] - Follow up with primary


Forms:  [email or web address] (English)

## 2019-03-25 NOTE — RAD
Date of service: 



03/25/2019



HISTORY:

Dyspnea.



COMPARISON:

02/11/2019. 



FINDINGS:



LUNGS:

No active pulmonary disease.



PLEURA:

No significant pleural effusion identified, no pneumothorax apparent.



CARDIOVASCULAR:

Atherosclerotic calcifications identified primarily aortic arch.



 No radiographic findings to suggest acute or significant 

cardiovascular disease.



OSSEOUS STRUCTURES:

No significant abnormalities.



VISUALIZED UPPER ABDOMEN:

Normal.



OTHER FINDINGS:

None.



IMPRESSION:

No active disease. No significant interval change compared to the 

prior examination(s).

## 2019-03-25 NOTE — CP.PCM.PCO
Physician Communication Note





- Physician Communication Note


Physician Communication Note: Patient states only takes 0.5 klonopin - 0.5 mg 

evening dose given once

## 2019-03-25 NOTE — CP.PCM.CON
History of Present Illness





- History of Present Illness


History of Present Illness: 


Gregory Jackson DO, PGY-1 Hematology/Oncology Consult Note for Dr. Charles 


Consult Requested by Dr. Millan, Dr. Baldwin


CC: SOB, wheezing


HPI: Patient is a 68 year old female with PMH of osteoporosis (on prolia), pHTN,

HTN, HLD, AFib (on eliquis), DM2, chronic low back pain, and L sided intra-

ductal carcinoma (s/p L mastectomy in 2005, s/p chemo/XRT and s/p patient 

preference R mastectomy in 2017) presented to [facility] ED with a complaint of 

worsening dyspnea. She states that it has been difficult to speak in full 

sentences secondary to dyspnea. She states the dyspnea is worse with exertion 

and improves with rest. She reports recently completing a course of abx for 

bronchitis. She reports having to use her rescue inhaler more frequently and 

that it is not helping. She otherwise denies fever/chills, CP, abd pain/nausea

/vomiting, or urinary complaints.





12 point ROS otherwise negative except as specified above.





PMD: Dr. Batista


Past Medical History: osteoporosis (on prolia), pHTN, HTN, HLD, AFib (on 

eliquis), DM2, chronic low back pain, and L sided intra-ductal carcinoma (s/p L 

mastectomy in 2005, s/p chemo/XRT and s/p patient preference R mastectomy in 

2017)


Past Surgical History: L mastectomy 2005, patient preference R mastectomy 2017


Allergies: NKA


Home medications: Lipitor, metformin, lasix, glipizide, singulair, eliquis, 

losartan, klonopin, vitamin D, digoxin, diltiazem


Family History: mother had stomach CA, brother has renal cell CA


Social History: admits to prior smoking history approximately 1/2 PPD but quit >

10 years ago, denies EtOH, denies illicit drug use, previously worked as a 

 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








Past Patient History





- Infectious Disease


Hx of Infectious Diseases: None





- Tetanus Immunizations


Tetanus Immunization: Unknown





- Past Social History


Smoking Status: Former Smoker





- CARDIAC


Hx Congestive Heart Failure: Yes


Hx Pacemaker: No





- PULMONARY


Hx Respiratory Disorders: Yes (SMOKED CIGARETTES QUIT 20 YRS AGO.)


Hx Asthma: Yes


Hx Chronic Obstructive Pulmonary Disease (COPD): Yes


Hx Sleep Apnea: Yes (CPAP)


Other/Comment: FORMER SMOKER





- NEUROLOGICAL


Hx Paralysis: No





- HEENT


Hx HEENT Problems: No





- RENAL


Hx Chronic Kidney Disease: No





- ENDOCRINE/METABOLIC


Hx Endocrine Disorders: Yes


Hx Hyperthyroidism: Yes





- HEMATOLOGICAL/ONCOLOGICAL


Hx Blood Transfusions: No


Hx Blood Transfusion Reaction:  (NA)





- INTEGUMENTARY


Hx Dermatological Problems: Yes


Other/Comment: TOTAL MASTECTOMY BILATERAL BREAST. ONE IN 2005 AND ONE 7-21-17





- MUSCULOSKELETAL/RHEUMATOLOGICAL


Hx Musculoskeletal Disorders: Yes





- GASTROINTESTINAL


Hx Gastrointestinal Disorders: Yes (LAP BAND SX)


Hx Gall Bladder Disease: Yes (CHOLECYSTECTOMY)


Hx Gastroesophageal Reflux: Yes





- GENITOURINARY/GYNECOLOGICAL


Hx Genitourinary Disorders: No





- PSYCHIATRIC


Hx Emotional Abuse: No


Hx Physical Abuse: No


Hx Substance Use: No





- SURGICAL HISTORY


Hx Cholecystectomy: Yes


Other/Comment: POST BILATERAL TOTAL MASTECTOMY. LEFT 2005,RIGHT 7-21-17.





- ANESTHESIA


Hx Anesthesia Reactions: No


Hx Malignant Hyperthermia: No





Meds


Allergies/Adverse Reactions: 


                                    Allergies











Allergy/AdvReac Type Severity Reaction Status Date / Time


 


No Known Allergies Allergy   Verified 03/25/19 14:08














- Medications


Medications: 


                               Current Medications





Albuterol/Ipratropium (Duoneb 3 Mg/0.5 Mg (3 Ml) Ud)  3 ml IH Q15MIN THU


   Stop: 03/27/19 13:31


   Last Admin: 03/25/19 14:19 Dose:  3 ml











Physical Exam





- Constitutional


Appears: Non-toxic, No Acute Distress





- Head Exam


Head Exam: ATRAUMATIC, NORMOCEPHALIC





- Eye Exam


Eye Exam: EOMI, PERRL





- ENT Exam


ENT Exam: Mucous Membranes Moist





- Neck Exam


Neck exam: Positive for: Full Rom, Normal Inspection





- Respiratory Exam


Respiratory Exam: Wheezes (end expiratory wheezes b/l).  absent: Accessory 

Muscle Use, Rales, Rhonchi, Respiratory Distress





- Cardiovascular Exam


Cardiovascular Exam: REGULAR RHYTHM, RRR, +S1, +S2.  absent: Diastolic murmur, 

Gallop, Rubs, Systolic Murmur





- GI/Abdominal Exam


GI & Abdominal Exam: Normal Bowel Sounds.  absent: Guarding, Rebound, Tenderness





- Extremities Exam


Extremities exam: Positive for: full ROM.  Negative for: pedal edema





- Back Exam


Back exam: NORMAL INSPECTION





- Neurological Exam


Neurological exam: Alert, Oriented x3





- Psychiatric Exam


Psychiatric exam: Normal Affect, Normal Mood





- Skin


Skin Exam: Dry, Intact, Warm





Results





- Vital Signs


Recent Vital Signs: 


                                Last Vital Signs











Temp  97.8 F   03/25/19 13:25


 


Pulse  104 H  03/25/19 17:43


 


Resp  20   03/25/19 17:43


 


BP  123/59 L  03/25/19 17:43


 


Pulse Ox  92 L  03/25/19 17:43














- Labs


Result Diagrams: 


                                 03/25/19 14:35


Labs: 


                         Laboratory Results - last 24 hr











  03/25/19 03/25/19 03/25/19





  14:00 14:35 14:35


 


PT   14.5 H 


 


INR   1.31 


 


APTT   35.1 


 


D-Dimer, Quantitative   < 200 


 


pCO2  35  


 


pO2  72.0 L  


 


HCO3  27.3  


 


ABG pH  7.50 H  


 


ABG Total CO2  28.4 H  


 


ABG O2 Saturation  96.5  


 


ABG O2 Content  15.8  


 


ABG Base Excess  4.1 H  


 


ABG Hemoglobin  12.0  


 


ABG Carboxyhemoglobin  1.9 H  


 


POC ABG HHb (Measured)  3.4  


 


ABG Methemoglobin  1.3  


 


ABG O2 Capacity  16.4  


 


Hgb O2 Saturation  93.4 L  


 


FiO2  28.0  


 


Sodium    140


 


Potassium    3.6


 


Chloride    104


 


Carbon Dioxide    27


 


Anion Gap    13


 


BUN    19


 


Creatinine    0.5 L


 


Est GFR ( Amer)    > 60


 


Est GFR (Non-Af Amer)    > 60


 


Random Glucose    110


 


Calcium    9.6


 


Phosphorus    4.7 H


 


Magnesium    1.3 L


 


Total Bilirubin    0.4


 


AST    30


 


ALT    15


 


Alkaline Phosphatase    49


 


Total Creatine Kinase    76


 


Troponin I    < 0.01


 


NT-Pro-B Natriuret Pep    258


 


Total Protein    7.1


 


Albumin    4.2


 


Globulin    3.0


 


Albumin/Globulin Ratio    1.4


 


Influenza Typ A,B (EIA)   














  03/25/19





  14:35


 


PT 


 


INR 


 


APTT 


 


D-Dimer, Quantitative 


 


pCO2 


 


pO2 


 


HCO3 


 


ABG pH 


 


ABG Total CO2 


 


ABG O2 Saturation 


 


ABG O2 Content 


 


ABG Base Excess 


 


ABG Hemoglobin 


 


ABG Carboxyhemoglobin 


 


POC ABG HHb (Measured) 


 


ABG Methemoglobin 


 


ABG O2 Capacity 


 


Hgb O2 Saturation 


 


FiO2 


 


Sodium 


 


Potassium 


 


Chloride 


 


Carbon Dioxide 


 


Anion Gap 


 


BUN 


 


Creatinine 


 


Est GFR ( Amer) 


 


Est GFR (Non-Af Amer) 


 


Random Glucose 


 


Calcium 


 


Phosphorus 


 


Magnesium 


 


Total Bilirubin 


 


AST 


 


ALT 


 


Alkaline Phosphatase 


 


Total Creatine Kinase 


 


Troponin I 


 


NT-Pro-B Natriuret Pep 


 


Total Protein 


 


Albumin 


 


Globulin 


 


Albumin/Globulin Ratio 


 


Influenza Typ A,B (EIA)  Negative for flu a/b














Assessment & Plan





- Assessment and Plan (Free Text)


Assessment: 


69 yo F with PMH of osteoporosis (on prolia), pHTN, HTN, HLD, AFib (on eliquis),

DM2, chronic low back pain, and L sided intra-ductal carcinoma (s/p L mastectomy

in 2005, s/p chemo/XRT and s/p patient preference R mastectomy in 2017) admitted

for dyspnea likely 2/2 asthma exacerbation and/or pulmonary HTN.


Plan: 


Dyspnea


Suspect most likely 2/2 asthma exacerbation and/or underlying pHTN


CTA chest completed in ED showed no PE


However, new lung nodules were noted


Will continue to follow these lung nodules regularly on outpatient basis as 

patient is at high risk of recurrence


Low suspicion that malignancy is contributing to patient's current symptoms


Recommend optimal medical management


Recommend pulmonology consult for additional recs





L IDC s/p L mastectomy


No evidence of recurrent disease on prior outpatient visits


Have been following since 2005


Will continue to monitor new lung nodules identified on CTA chest on outpatient 

basis


Recommend continuing home eliquis for hx of AFib





Thank you for this consult. We will continue to follow. 





Patient seen, examined with, and plan discussed with my attending Dr. Melvin Jackson, LIZZYO. 


IM Resident PGY-1

## 2019-03-25 NOTE — CARD
--------------- APPROVED REPORT --------------





Date of service: 03/25/2019



EKG Measurement

Heart [id number]

[id number]

[id number]

[id number]



<Conclusion>

Atrial fibrillation with rapid ventricular response

Abnormal ECG

## 2019-03-25 NOTE — CT
Date of service: 03/25/2019



CTA chest PE protocol 



Indication: dyspnea, h/o cancer, r/o PE



Technique: 



Contiguous axial images were obtained through the chest with 

intravenous contrast enhancement. Sagittal and coronal 

reconstructions were generated and reviewed. 



This CT exam was performed using 1 or more of the following dose 

reduction techniques: Automated exposure control, adjustment of the 

MAA and/or kV according to patient size, and/or use of iterative 

reconstruction technique. 



IV contrast: 150 mL Omnipaque 350 IV







Radiation dose (DLP): 439.99 MGy-cm. 



Comparison: Chest x-ray performed 3/25/19, CTA chest performed 9/19/18



Findings: 



Partially imaged thyroid gland demonstrates enlarged heterogeneous 

right and diminutive left thyroid gland with bilateral 

calcifications. The mediastinal and hilar vascular structures appear 

within normal limits.  Cardiomegaly. 



No large central or segmental pulmonary embolus evident.



No focal consolidation. No pleural effusion. No pneumothorax.  

Multiple small nodular opacities measuring approximately 4 mm and 

below 



Limited visualized portions of the upper abdomen appear grossly 

unremarkable. 



The patient has undergone bilateral mastectomy with right breast 

prosthesis present.  Osseous demineralization.  Multilevel 

degenerative changes.  Kyphosis. 



Impression: 



No large central or segmental pulmonary embolus identified.  



Numerous scattered pulmonary nodules measuring up to 4 mm; according 

to the 2017 Fleischner criteria, the patient is high risk, an 

optional CT at 12 months is recommended. 



Status post bilateral mastectomy with right breast prosthesis 

present. 



Partially imaged thyroid gland demonstrates enlarged heterogeneous 

right and diminutive left thyroid gland with bilateral calcifications.

## 2019-03-26 NOTE — HP
DATE OF EXAM:  03/25/2019



HISTORY OF PRESENT ILLNESS:  The patient is a 68-year-old came to the

emergency room because of worsening shortness of breath and chest

tightness.  She is unable to speak in full sentence.  The patient complain

of cough and congestion, has been using a rescue inhaler with no

significant relief.  No history of fever or chills.  No nausea or vomiting.

No diarrhea.



PAST MEDICAL HISTORY:  Significant for:

1.  Hypertension.

2.  Hyperlipidemia.

3.  Chronic AFib, on Eliquis.

4.  Noninsulin-dependent diabetes.

5.  Degenerative disk disease.

6.  Left intraductal carcinoma, status post mastectomy in 2015.

7.  Status post chemo and radiation, status post right mastectomy in 2017.



ALLERGIES:  SHE IS NOT ALLERGIC TO ANY MEDICATION.



MEDICATIONS AT HOME:  She is on _____ metformin, Adempas, Singulair,

gabapentin, atorvastatin, Klonopin, Demodex, Lexapro, glipizide, Eliquis,

losartan, diltiazem, and digoxin.



SOCIAL HISTORY:  Used to be heavy smoker, but quit 20 years ago.



PHYSICAL EXAMINATION:

GENERAL:  She is awake and alert, able to communicate.

VITAL SIGNS:  She is afebrile, pulse 98, respirations 20, and blood

pressure 106/59.

LUNGS:  Bilateral _____ rhonchi.

HEART:  S1 and S2 audible, irregular, rate controlled.

ABDOMEN:  Soft and nontender.  No rebound.  No guarding.

NEUROLOGIC:  The patient is awake and alert, able to communicate.



LABORATORY DATA:  PT 14.5 and INR 1.31.  Chemistry; sodium 140, potassium

3.6, chloride 104, CO2 of 27, BUN 19, creatinine 0.5, blood sugar 110,

phosphorous 2.7, and magnesium 1.3.  LFTs are within normal limits.  Flu

test is negative.  She has CT angio done, no PE noted, numerous scattered

pulmonary nodule measuring up to 4 mm according to 2017 criteria.  The

patient has HIDA scan _____ status post bilateral mastectomy and right

breast implant.



ASSESSMENT:

1.  Dyspnea.

2.  History of chronic obstructive pulmonary disease.

3.  Atrial fibrillation.

4.  Hypertension.

5.  Hyperlipidemia.

6.  Peptic ulcer disease.

7.  Bronchospasm.

8.  Chronic anemia.

9.  History of carcinoma of breast, status post bilateral mastectomy.

10.  History of depression.

11.  Morbid obesity.

12.  Noninsulin-dependent diabetes.



PLAN:  The patient will be admitted on telemetry.  We will start her on IV

steroids.  We will start her on nebulizer treatment.  We will resume her

medications.  We will monitor her blood sugar.  We will resume her

medication.  We will follow up the patient in a.m.







__________________________________________

Stephani Baldwin MD





DD:  03/25/2019 19:09:52

DT:  03/25/2019 20:55:31

Job # [id number]

## 2019-03-26 NOTE — CP.PCM.PN
Subjective





- Date & Time of Evaluation


Date of Evaluation: 03/26/19


Time of Evaluation: 07:00





- Subjective


Subjective: 


Gregory Jackson DO, PGY-1 Hematology/Oncology Progress Note for Dr. Charles 


Patient was seen and examined at bedside this AM. She offers no new complaints 

and states she is feeling less short of breath. 








Objective





- Vital Signs/Intake and Output


Vital Signs (last 24 hours): 


                                        











Temp Pulse Resp BP Pulse Ox


 


 98 F   101 H  20   93/61 L  93 L


 


 03/26/19 06:00  03/26/19 06:00  03/26/19 06:00  03/26/19 06:00  03/26/19 06:00








Intake and Output: 


                                        











 03/26/19 03/26/19





 06:59 18:59


 


Intake Total 200 


 


Output Total 0 


 


Balance 200 














- Medications


Medications: 


                               Current Medications





Albuterol/Ipratropium (Duoneb 3 Mg/0.5 Mg (3 Ml) Ud)  3 ml IH Q15MIN [facility]


   Stop: 03/27/19 13:31


   Last Admin: 03/25/19 14:19 Dose:  3 ml


Albuterol/Ipratropium (Duoneb 3 Mg/0.5 Mg (3 Ml) Ud)  3 ml IH Q2H PRN


   PRN Reason: Shortness of Breath


Albuterol/Ipratropium (Duoneb 3 Mg/0.5 Mg (3 Ml) Ud)  3 ml IH [id number] [facility]


   Last Admin: 03/26/19 01:55 Dose:  Not Given


Apixaban (Eliquis)  2.5 mg PO BID [facility]; Protocol


Arformoterol Tartrate (Brovana)  15 mcg IH [id number] [facility]


   Last Admin: 03/26/19 07:55 Dose:  15 mcg


Atorvastatin Calcium (Lipitor)  40 mg PO HS [facility]


   Last Admin: 03/25/19 21:42 Dose:  40 mg


Budesonide (Pulmicort Respules)  0.5 mg IH [id number] [facility]


   Last Admin: 03/26/19 07:55 Dose:  0.5 mg


Clonazepam (Klonopin)  1 mg PO BID PRN; Protocol


   PRN Reason: Anxiety


Digoxin (Digoxin)  0.125 mg PO 1400 [facility]


Diltiazem HCl (Cardizem Cd)  180 mg PO DAILY [facility]


Escitalopram Oxalate (Lexapro)  10 mg PO DAILY [facility]


Gabapentin (Neurontin)  200 mg PO BID [facility]; Protocol


Glipizide (Glucotrol)  5 mg PO ACB [facility]


   Last Admin: 03/26/19 08:41 Dose:  5 mg


Losartan Potassium (Cozaar)  100 mg PO DAILY [facility]


Metformin HCl (Glucophage)  500 mg PO BID [facility]


   Last Admin: 03/25/19 21:42 Dose:  500 mg


Methylprednisolone (Solu-Medrol)  40 mg IV Q12 [facility]


   Last Admin: 03/25/19 21:42 Dose:  40 mg


Montelukast Sodium (Singulair)  10 mg PO HS [facility]


   Last Admin: 03/25/19 21:41 Dose:  10 mg


Non-Formulary Medication (Riociguat [Adempas])  1 tab PO TID [facility]











- Labs


Labs: 


                                        





                                 03/25/19 13:29 





                                 03/25/19 14:35 





                                        











PT  14.5 SECONDS (9.4-12.5)  H  03/25/19  14:35    


 


INR  1.31   03/25/19  14:35    


 


APTT  35.1 Seconds (26.9-38.3)   03/25/19  14:35    














- Constitutional


Appears: Non-toxic, No Acute Distress





- Head Exam


Head Exam: ATRAUMATIC, NORMOCEPHALIC





- Eye Exam


Eye Exam: EOMI, PERRL





- ENT Exam


ENT Exam: Mucous Membranes Moist





- Neck Exam


Neck Exam: Full ROM, Lymphadenopathy (R supraclavicular nodes palpated)





- Respiratory Exam


Respiratory Exam: Wheezes (faint end expiratory wheezes b/l improved).  absent: 

Rales, Rhonchi





- Cardiovascular Exam


Cardiovascular Exam: REGULAR RHYTHM, RRR, +S1, +S2.  absent: Gallop, Rubs, 

Murmur





- GI/Abdominal Exam


GI & Abdominal Exam: Soft, Normal Bowel Sounds.  absent: Guarding, Tenderness





- Extremities Exam


Extremities Exam: Full ROM, Normal Inspection.  absent: Pedal Edema





- Back Exam


Back Exam: NORMAL INSPECTION





- Neurological Exam


Neurological Exam: Alert, Awake, Oriented x3





- Psychiatric Exam


Psychiatric exam: Normal Affect, Normal Mood





- Skin


Skin Exam: Dry, Intact, Warm





Assessment and Plan





- Assessment and Plan (Free Text)


Assessment: 


69 yo F with PMH of osteoporosis (on prolia), pHTN, HTN, HLD, AFib (on eliquis),

 DM2, chronic low back pain, and L sided intra-ductal carcinoma (s/p L 

mastectomy in 2005, s/p chemo/XRT and s/p patient preference R mastectomy in 

2017) admitted for dyspnea likely 2/2 asthma exacerbation and/or pulmonary HTN.


Plan: 


Dyspnea


Recommend continuing optimal medical management


Low suspicion that newly found lung nodules are contributing to dyspnea sx's


F/u additional pulmonology recs





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


No evidence of recurrent disease on prior outpatient visits


Dr. Charles has been following since 2005


Recommend continuing home eliquis for hx of AFib





Thyroid Mass


Identified on prior thyroid US


Will need biopsy, given history


Imaging reviewed by Dr. Tomlinson and case discussed, will get CT neck w/contrast to

 further characterize lesion prior to biopsy





Thank you for this consult. We will continue to follow. 





Patient seen, examined with, and plan discussed with my attending Dr. Melvin Jackson D.O. 


IM Resident PGY-1

## 2019-03-26 NOTE — CT
Date of service: 



03/26/2019



PROCEDURE:  CT NECK WITHOUT  CONTRAST



HISTORY:

hx inflammatory breast CA, mass on thyroid US



COMPARISON:

Ultrasound of the thyroid 02/11/2019



TECHNIQUE:

CT of the neck without intravenous contrast. Coronal and sagittal 

reformats generated. 



Radiation dose:



Total exam DLP = 444.66 mGy-cm.



This CT exam was performed using one or more of the following dose 

reduction techniques: Automated exposure control, adjustment of the 

mA and/or kV according to patient size, and/or use of iterative 

reconstruction technique.



FINDINGS:



NASOPHARYNX:

Unremarkable.



SUPRAHYOID NECK:

Unremarkable oropharynx, oral cavity, parapharyngeal space and 

retropharyngeal space.



INFRAHYOID NECK:

Unremarkable larynx, hypopharynx, and supraglottic space. Vocal cords 

intact.



MASS:

None.



GLANDS:

Parotid and submandibular glands unremarkable. There is enlargement 

of the right lobe of the thyroid.  There is a small calcification in 

the right lobe.  There is a thyroid nodule along the anterior 

surface.  The left lobe is unremarkable.



LYMPH NODES:

Normal. No lymphadenopathy.



CERVICAL SPINE:

Multilevel disc degeneration 



OTHER FINDINGS:

Aortic and carotid calcifications



IMPRESSION:

There is enlargement of the right lobe of the thyroid.  There is a 

small calcification in the right lobe.  There is a thyroid nodule 

along the anterior surface.  The left lobe is unremarkable.

## 2019-03-26 NOTE — CON
DATE:  03/26/2019



PULMONARY CONSULTATION NOTE



REFERRING PHYSICIAN:  Stephani Baldwin MD



REASON FOR CONSULTATION:  Asthma exacerbation, pulmonary hypertension,

sleep apnea, shortness of breath and cough.



HISTORY OF PRESENT ILLNESS:  This is a 68-year-old female with past medical

history consistent for atrial fibrillation, pulmonary hypertension, asthma,

sleep apnea, breast cancer status post mastectomy, who presented to the

emergency room complaining of shortness of breath, stated that she while in

the emergency room that she was having difficulty speaking in full

sentences secondary to shortness of breath.  The patient reported at that

time that she took her asthma medication, but did not take any nebulizer

treatments at home.  The patient also reports to recently completing

antibiotic therapy for viral upper respiratory infection.  Today the

patient is seen sitting in armchair in room, states shortness of breath is

much better, but she does still get shortness of breath with exertion,

still has occasional dry cough.



PAST MEDICAL HISTORY:  Hypertension, hyperlipidemia, chronic AFib on

Eliquis, non-insulin dependent diabetes, degenerative disc disease, left

intraductal carcinoma, status post mastectomy, status post chemotherapy and

radiation, status post right mastectomy in 2017 and left mastectomy in

2005.  The patient also had chemotherapy and radiation after left

mastectomy.  Osteoporosis and chronic lower back pain.



ALLERGIES:  NO KNOWN DRUG ALLERGIES.



SOCIAL HISTORY:  Former heavy smoker quit 20 years ago.  No EtOH abuse.  No

illicit drug use.



FAMILY HISTORY:  No significant cardiopulmonary disease reported.



MEDICATIONS:  DuoNeb 3 mL inhalation every 15 minutes, DuoNeb 3 ml

inhalation every 2 hours p.r.n., DuoNeb 3 mL inhalation every 6 hours,

Eliquis 2.5 mg twice a day, Brovana 15 mcg every 12 hours, Lipitor 40 mg at

bedtime, Pulmicort 0.5 mg inhalation every 12 hours, Klonopin 0.5 mg twice

a day p.r.n., digoxin 0.125 mg daily, Cardizem 180 mg daily, Lexapro 10 mg

daily, Neurontin 200 mg twice a day, glipizide 5 mg in the morning, Cozaar

100 mg daily, metformin 500 mg twice a day, Solu-Medrol 40 mg every 12

hours, Singulair 10 mg at bedtime and Adempas one tablet 3 times a day.



REVIEW OF SYSTEMS:  No headache, rhinitis, chest pain, abdominal pain,

nausea, vomiting, diarrhea or leg pain reported.  The patient does report

getting shortness of breath with exertion, but states that shortness of

breath has improved since yesterday.  Reports dry cough.  Reports swelling

to bilateral lower extremities.



PHYSICAL EXAMINATION:

GENERAL:  No acute distress.

HEENT:  Moist mucous membranes.

NECK:  Supple.  No JVD.

CARDIOVASCULAR:  S1 and S2 irregular.

RESPIRATORY:  Few scattered rhonchi bilaterally.

ABDOMEN:  Soft and nontender.  No distention.  No organomegaly.

EXTREMITIES:  Trace bilateral lower extremity edema.

NEUROLOGIC:  Awake, alert and verbal, able to follow commands.



LABORATORY DATA:  Reviewed.  WBC 6.8, RBC 4.28, hemoglobin 12.2, hematocrit

38.6, and platelets 216.  PT 14.5, INR 1.31 and APTT 35.1.  D-dimer less

than 200.  PCO2 of 35, PO2 of 72, HCO3 of 27.3, ABG pH 7.50 on FiO2 of 28. 

Sodium 140, potassium 3.6, chloride 104, carbon dioxide 27, anion gap 13,

BUN 19, creatinine 0.5, GFR greater than 60, POC glucose 226, random

glucose 110, calcium 9.6, phosphorous 4.7, magnesium 1.3, total bilirubin

0.4, AST 30, ALT 15, alkaline phosphatase 49, total creatine kinase 76,

troponin less than 0.01, proBNP 258, total protein 7.1, albumin 4.2,

globulin 3.0 and albumin-globulin ratio 1.4.  Influenza type A and B

negative.  EKG shows atrial fibrillation on rapid ventricular response. 

Chest x-ray showed no active disease.  Chest CT showed no large central or

subsegmental pulmonary embolism, numerous scattered pulmonary nodules

measuring up to 4 mm, status post bilateral mastectomy with right breast

prosthesis present.  Partially thyroid gland demonstrates enlarged

heterogeneous right and diminutive left thyroid gland with bilateral

calcifications.



IMPRESSION AND PLAN:  Asthma exacerbation, sleep apnea syndrome, pulmonary

hypertension, atrial fibrillation with rapid ventricular response on

Eliquis, diabetes mellitus, hypertension and hyperlipidemia.  The patient

with history of breast cancer with chemotherapy and radiation in the past. 

CAT scan showing scattered pulmonary nodules which may be radiation induced

pneumonitis, may be inflammatory process, cannot rule out malignancy, will

need followup CAT scan in 3 to 4 months.  We will order procalcitonin level

to be done.  We will place the patient on Protonix for gastric prophylaxis.

The patient currently on anticoagulation therapy.  We will order

echocardiogram to test pulmonary hypertension.  Continue Hematology and

Oncology followup.  Agree with inhaled bronchodilators, steroids,

leukotriene inhibitors, fall precautions.  Continue continuous positive

airway pressure use at bedtime, sleep apnea precaution, head of bed

elevated at 45 degrees.  The patient is using her own continuous positive

airway pressure from home which is set at 9 cm.  Nursing staff and the

patient reports that she lost her pulmonary hypotension medication Adempas,

but the patient reports that she called her pharmacy who will be delivering

the medication and she will have a family member or friend deliver the

medication to her either tonight or tomorrow.  The patient reports that she

has been taking medications for the last 2 weeks.  We will stop DuoNeb and

Brovana as the patient showing atrial fibrillation on EKG.  We will place

the patient on Xopenex and Atrovent nebulizer treatments.



This patient was seen and examined with Dr. Amos.  Discussed assessment

and plan as described above.



This patient was seen and examined by Scott Gordon, nurse practitioner. 

Discussed assessment and plan as described above.



Thank you for this consult and we will follow with you.







__________________________________________

ANTOINE Torres





__________________________________________

Gagan Amos MD





DD:  03/26/2019 12:20:11

DT:  03/26/2019 13:10:20

Job # [id number]

PIERO

## 2019-03-26 NOTE — CP.PCM.CON
History of Present Illness





- History of Present Illness


History of Present Illness: 


Patient is a 68 year old female with past medical history of L sided intra-

ductal carcinoma (s/p L mastectomy in 2005, s/p chemo/XRT), pHTN, HTN, HLD, AFib

(on eliquis), T2DM, asthma, osteoporosis, and chronic low back pain presented to

the ED for worsening SOB and wheezing. CTA on 3/25/19 to r/o PE showed 

incidental thyroid finding of "enlarged heterogeneous right and diminutive left 

thyroid gland with bilateral calcifications. The patient had a thyroid 

ultrasound done on 2/12/19 that showed a 17 mm isoechoic solid nodule in the 

right lobe with macroscopic calcifications and a 9 mm echogenic nodule in the 

mid left lobe. The patient states she had a normal thyroid panel as of January 2019 and has never taken thyroid medication. The patient states there has been a

mass growing on the right side of her neck over the past 6 months along with 100

lbs weight gain and heat intolerance over the past 12 months.





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 

pHTN, HTN, HLD, AFib (on eliquis), T2DM, asthma, osteoporosis, and chronic low 

back pain


PSHx: Left mastectomy in 2015, R mastectomy in 2017, cholecystectomy


NKDA


Patient states she smoked 1 pack per week for 20 years but quit 25 years ago 

(Other notes state she smoke 1/2 ppd, quit 10 years ago)


FamHx: mother had "stomach cancer", brother had "kidney cancer" 





General Surgery:  Dr Keller





Review of Systems





- Review of Systems


All systems: reviewed and no additional remarkable complaints except (As per 

HPI)





- EENT


Nose/Mouth/Throat: Neck Mass





Past Patient History





- Infectious Disease


Hx of Infectious Diseases: None





- Tetanus Immunizations


Tetanus Immunization: Unknown





- Past Medical History & Family History


Past Medical History?: Yes





- Past Social History


Smoking Status: Former Smoker





- CARDIAC


Hx Cardia Arrhythmia: Yes (afib)


Hx Congestive Heart Failure: Yes


Hx Pacemaker: No





- PULMONARY


Hx Respiratory Disorders: Yes (SMOKED CIGARETTES QUIT 20 YRS AGO.)


Hx Asthma: Yes


Hx Chronic Obstructive Pulmonary Disease (COPD): Yes


Hx Sleep Apnea: Yes (CPAP)


Other/Comment: FORMER SMOKER





- NEUROLOGICAL


Hx Neurological Disorder: No





- HEENT


Hx HEENT Problems: No





- RENAL


Hx Chronic Kidney Disease: No





- ENDOCRINE/METABOLIC


Hx Endocrine Disorders: Yes


Hx Diabetes Mellitus Type 2: Yes


Hx Hyperthyroidism: Yes





- HEMATOLOGICAL/ONCOLOGICAL


Hx Blood Disorders: Yes


Hx Cancer: Yes (b/l breast cancer)


Hx Chemotherapy: Yes (AND RADIATION TO L BREAT COMPLETED)





- INTEGUMENTARY


Hx Dermatological Problems: Yes


Other/Comment: TOTAL MASTECTOMY BILATERAL BREAST. ONE IN 2005 AND ONE 7-21-17





- MUSCULOSKELETAL/RHEUMATOLOGICAL


Hx Falls: No





- GASTROINTESTINAL


Hx Gastrointestinal Disorders: Yes (LAP BAND SX)


Hx Gall Bladder Disease: Yes (CHOLECYSTECTOMY)


Hx Gastroesophageal Reflux: Yes





- GENITOURINARY/GYNECOLOGICAL


Hx Genitourinary Disorders: No





- PSYCHIATRIC


Hx Emotional Abuse: No


Hx Physical Abuse: No


Hx Substance Use: No





- SURGICAL HISTORY


Hx Surgeries: Yes


Hx Cholecystectomy: Yes


Other/Comment: POST BILATERAL TOTAL MASTECTOMY. right 2005, left 7-21-17.





- ANESTHESIA


Hx Anesthesia Reactions: No


Hx Malignant Hyperthermia: No





Meds


Allergies/Adverse Reactions: 


                                    Allergies











Allergy/AdvReac Type Severity Reaction Status Date / Time


 


No Known Allergies Allergy   Verified 03/25/19 14:08














- Medications


Medications: 


                               Current Medications





Apixaban (Eliquis)  2.5 mg PO BID [facility]; Protocol


   Last Admin: 03/26/19 09:16 Dose:  2.5 mg


Atorvastatin Calcium (Lipitor)  40 mg PO HS [facility]


   Last Admin: 03/25/19 21:42 Dose:  40 mg


Budesonide (Pulmicort Respules)  0.5 mg IH [id number] [facility]


   Last Admin: 03/26/19 07:55 Dose:  0.5 mg


Clonazepam (Klonopin)  0.5 mg PO BID [facility]; Protocol


   PRN Reason: Anxiety


Digoxin (Digoxin)  0.125 mg PO 1400 [facility]


Diltiazem HCl (Cardizem Cd)  180 mg PO DAILY [facility]


   Last Admin: 03/26/19 09:17 Dose:  180 mg


Escitalopram Oxalate (Lexapro)  10 mg PO DAILY [facility]


   Last Admin: 03/26/19 09:16 Dose:  10 mg


Gabapentin (Neurontin)  200 mg PO BID [facility]; Protocol


   Last Admin: 03/26/19 09:15 Dose:  200 mg


Glipizide (Glucotrol)  5 mg PO ACB [facility]


   Last Admin: 03/26/19 08:41 Dose:  5 mg


Magnesium Sulfate/Dextrose (Magnesium Sulfate 1 Gm/100 Ml D5w)  1 gm in 100 mls 

@ 100 mls/hr IVPB ONCE ONE


   Stop: 03/26/19 15:54


Ipratropium Bromide (Atrovent)  0.5 mg IH TIDRESP [facility]


   Last Admin: 03/26/19 13:46 Dose:  0.5 mg


Levalbuterol HCl (Xopenex)  0.63 mg IH TIDRESP [facility]


   Last Admin: 03/26/19 13:51 Dose:  0.63 mg


Levalbuterol HCl (Xopenex)  0.63 mg IH [id number] PRN


   PRN Reason: Shortness of Breath


Losartan Potassium (Cozaar)  100 mg PO DAILY [facility]


   Last Admin: 03/26/19 12:00 Dose:  100 mg


Metformin HCl (Glucophage)  500 mg PO BID [facility]


   Last Admin: 03/26/19 09:16 Dose:  500 mg


Methylprednisolone (Solu-Medrol)  40 mg IV Q12 [facility]


   Last Admin: 03/26/19 09:15 Dose:  40 mg


Montelukast Sodium (Singulair)  10 mg PO HS [facility]


   Last Admin: 03/25/19 21:41 Dose:  10 mg


Non-Formulary Medication (Riociguat [Adempas])  1 tab PO TID [facility]


Pantoprazole Sodium (Protonix Ec Tab)  40 mg PO [facility]











Physical Exam





- Constitutional


Appears: No Acute Distress





- Neck Exam


Neck exam: Positive for: Full Rom, Lymphadenopathy.  Negative for: Tenderness





Results





- Vital Signs


Recent Vital Signs: 


                                Last Vital Signs











Temp  98.5 F   03/26/19 12:00


 


Pulse  100 H  03/26/19 12:00


 


Resp  19   03/26/19 12:00


 


BP  112/68   03/26/19 12:00


 


Pulse Ox  93 L  03/26/19 06:00














- Labs


Result Diagrams: 


                                 03/25/19 13:29





                                 03/26/19 13:15


Labs: 


                         Laboratory Results - last 24 hr











  03/25/19 03/25/19 03/26/19





  13:29 21:58 07:22


 


WBC  6.8  


 


RBC  4.28  


 


Hgb  12.2  


 


Hct  38.6  


 


MCV  90.2  


 


MCH  28.5  


 


MCHC  31.6  


 


RDW  17.2 H  


 


Plt Count  216  


 


MPV  12.1 H  


 


Neut % (Auto)  59.9  


 


Lymph % (Auto)  29.8  


 


Mono % (Auto)  7.3 H  


 


Eos % (Auto)  2.3  


 


Baso % (Auto)  0.7  


 


Lymph # (Auto)  2.0  


 


Mono # (Auto)  0.5  


 


Eos # (Auto)  0.2  


 


Baso # (Auto)  0.05  


 


Absolute Neuts (auto)  4.09  


 


Sodium   


 


Potassium   


 


Chloride   


 


Carbon Dioxide   


 


Anion Gap   


 


BUN   


 


Creatinine   


 


Est GFR ( Amer)   


 


Est GFR (Non-Af Amer)   


 


POC Glucose (mg/dL)   226 H  207 H


 


Random Glucose   


 


Calcium   


 


Magnesium   














  03/26/19 03/26/19





  11:29 13:15


 


WBC  


 


RBC  


 


Hgb  


 


Hct  


 


MCV  


 


MCH  


 


MCHC  


 


RDW  


 


Plt Count  


 


MPV  


 


Neut % (Auto)  


 


Lymph % (Auto)  


 


Mono % (Auto)  


 


Eos % (Auto)  


 


Baso % (Auto)  


 


Lymph # (Auto)  


 


Mono # (Auto)  


 


Eos # (Auto)  


 


Baso # (Auto)  


 


Absolute Neuts (auto)  


 


Sodium   136


 


Potassium   3.9


 


Chloride   99


 


Carbon Dioxide   21


 


Anion Gap   20


 


BUN   20


 


Creatinine   0.4 L


 


Est GFR ( Amer)   > 60


 


Est GFR (Non-Af Amer)   > 60


 


POC Glucose (mg/dL)  308 H 


 


Random Glucose   258 H


 


Calcium   9.8


 


Magnesium   1.6 L














Assessment & Plan





- Assessment and Plan (Free Text)


Assessment: 


68F w/ cervical neck mass ddx lymphadenopathy vs thyroid nodule vs cancerous 

mass





Plan: 








Pt will need biopsy outpatient. 


Patient will be contacted by Dr. Porras's office after discharge for 

scheduling


will need to hold eliquis prior to procedure





d/w Dr Vern Matos, PGY4

## 2019-03-27 NOTE — PN
DATE:  03/26/2019



SUBJECTIVE:  The patient is a 68-year-old seen and examined sitting in

chair, seems to be comfortable, complained of shortness of breath walking

small distance.  The patient states she stays home alone.  She was very

short of breath with minimal exertion, that is why she came to ER.



PHYSICAL EXAMINATION:

VITAL SIGNS:  She is afebrile, pulse 92, respirations 20, blood pressure

112/68.

LUNGS:  Bilateral soft crackle at bases and no active rhonchi or crackle

heard.

HEART:  S1, S2 audible.

ABDOMEN:  Soft, obese, nontender.  No rebound, no guarding.

NEUROLOGIC:  The patient is awake and alert, able to communicate.

EXTREMITIES:  Bilateral legs, +2 edema.



LABORATORY EXAM:  Chemistry:  Sodium 136, potassium 3.9, chloride 99, CO2

of 21, BUN 20, creatinine of 0.4, blood sugar up to 79.  Procalcitonin

0.05.  CT scan of the head and neck shows enlargement of right lobe of

thyroid.  There is small calcification in the right nodule.  There is a

thyroid nodule along the anterior _____.  The left lobe is unremarkable.



ASSESSMENT AND PLAN:

1.  Morbid obesity.

2.  History of pulmonary hypertension.

3.  Chronic atrial fibrillation.

4.  Non-insulin-dependent diabetes.

5.  History of carcinoma breast, status post bilateral mastectomy.

6.  History of sleep apnea.

7.  Hyperlipidemia.



Currently, the patient is on Cardizem CD.  She is getting

angiotensin-converting enzyme inhibitors.  She is on digoxin.  She is on

Eliquis and metformin.  Her blood sugar seems to be running high.  We will

increase her glipizide before breakfast and before dinner.  Continue her on

statin.  She is on Pulmicort.  I will request Dr. Erazo for evaluation.  The

patient states she has cardiac cath done by Dr. Erazo and was told her

blockage is not more than 70%.  So, we will get Dr. Erazo's inputs.  We will

follow up her electrolytes in a.m. and CBC in a.m., and request for

physical therapy evaluation.  The patient might benefit from TCU.







__________________________________________

Stephani Baldwin MD





DD:  03/26/2019 19:10:19

DT:  03/26/2019 21:33:47

Job # [id number]

## 2019-03-27 NOTE — CARD
--------------- APPROVED REPORT --------------





Date of service: 03/27/2019



EXAM: Two-dimensional and M-mode echocardiogram with Doppler and 

color Doppler.



INDICATION

Pulmonary Hypertention LVFX/AS



2D DIMENSIONS 

Left Atrium (2D)5.2   (1.6-4.0cm)IVSd1.0   (0.7-1.1cm)

LVDd4.8   (3.9-5.9cm)LVOT Diameter1.8   (1.8-2.4cm)

PWd1.2   (0.7-1.1cm)LVDs3.3   (2.5-4.0cm)

FS (%) 31.4   %LVEF (%)59.2   (>50%)



M-Mode DIMENSIONS 

Aortic Root3.30   (2.2-3.7cm)Aortic Cusp Exc.0.80   (1.5-2.0cm)



Aortic Valve

AoV Peak Crzvqrgh848.0cm/sAoV VTI65.6cmAO Peak GR.47mmHg

LVOT Peak Qvamhedz98.0cm/sLVOT VTI18.90cmAO Mean GR.24mmHg

PAUL (VMAX)0.83al1YVD (VTI)0.73cm2



Mitral Valve

E/A ratio0.0



TDI

E/Lateral E'0.0E/Medial E'0.0



Pulmonary Valve

PV Peak Muavqehd44.3cm/sPV Peak Grad.2mmHg



Tricuspid Valve

TR Peak Qcfhtbun296hn/sRAP EFNWPPBZ41lcOdGO Peak Gr.45mmHg

[id number]



 LEFT VENTRICLE 

The left ventricle is normal size. There is mild concentric left 

ventricular hypertrophy. The left ventricular function is 

normal.EF-65% ( afib). There is normal LV segmental wall motion. Pt 

was in A fib , diastlic Fx could not be assessed. No left ventricle 

thrombus noted on this study. There is no ventricular septal defect 

visualized. There is no left ventricular aneurysm. There is no mass 

noted in the left ventricle.



 RIGHT VENTRICLE 

The right ventricle is mildly to moderately dilated. There is normal 

right ventricular wall thickness. Systolic function is mildly reduced.



 ATRIA 

The left atrium is moderately dilated. The right atrium is moderately 

dilated. The interatrial septum is intact with no evidence for an 

atrial septal defect.



 AORTIC VALVE 

The aortic valve is calcified and displays decreased opening. There 

is trace aortic regurgitation. Possibly moderate to Severe valvular 

aortic stenosis,Consider MARK To Assess Sevirity of AS There is no 

aortic valvular vegetation.



 MITRAL VALVE 

The mitral valve is thickened but opens well. Mitral regurgitation is 

mild to moderate. There is no mitral valve stenosis. There is no 

evidence of mitral valve prolapse.



 TRICUSPID VALVE 

The tricuspid valve leaflets are thickened , but open well. There is 

moderate tricuspid regurgitation.RVSP-55 Mmof hg. There is moderate 

pulmonary hypertension. There is no tricuspid valve stenosis. There 

is no tricuspid valve prolapse or vegetation.



 PULMONIC VALVE 

The pulmonary valve is normal in structure. There is trace pulmonic 

valvular regurgitation. There is no pulmonic valvular stenosis.



 GREAT VESSELS 

The aortic root is normal in size. The ascending aorta is normal in 

size. The pulmonary artery is normal. The IVC is normal in size and 

collapses >50% with inspiration.



 PERICARDIAL EFFUSION 

There is no pleural effusion. There is no pericardial effusion.



<Conclusion>

There is mild concentric left ventricular hypertrophy.

The left ventricular function is normal.EF-65% ( afib).

Mitral regurgitation is mild to moderate.

There is moderate tricuspid regurgitation.RVSP-55 Mmof hg.

There is moderate pulmonary hypertension.

The IVC is normal in size and collapses >50% with inspiration.

There is no pericardial effusion.

Dilated RA/RV/La

Possibly  moderate to Severe valvular aortic stenosis,Consider MARK To 

Assess Sevirity of AS.

No vegetation or thrombus noted.

## 2019-03-27 NOTE — PN
DATE:  03/27/2019



SUBJECTIVE:  The patient is a 68-year-old, seen and examined.  Still has

shortness of breath, more so on walking.  Just came back from

echocardiogram.



PHYSICAL EXAMINATION

VITAL SIGNS:  She is afebrile.  Pulse 114, respirations 22 and blood

pressure 121/70.

LUNGS:  Bilateral fair airflow.  Few expiratory rhonchi.

HEART:  S1 and S2 audible.

ABDOMEN:  Soft, obese and nontender.  No rebound.  No guarding.

NEUROLOGIC:  She is awake and alert, able to communicate.

EXTREMITIES:  Bilateral legs no edema.

BREASTS:  Status post bilateral mastectomy.



LABORATORY DATA:  WBC 15, hemoglobin 11.8, hematocrit 37.6 and platelet of

214.  PT 14.5 and INR 1.31.  Chemistry; sodium 136, potassium 4.2, chloride

100, CO2 of 25, BUN 19, creatinine 0.5 and blood sugar of 223.  She had

echocardiogram done that seems to be unremarkable.  She has

electrocardiogram that is pending.



ASSESSMENT:

1.  Chronic obstructive pulmonary disease exacerbation.

2.  History of pulmonary hypertension.

3.  Chronic atrial fibrillation.

4.  Non-insulin dependent diabetes.

5.  Morbid obesity.

6.  Hyperlipidemia.

7.  Status post bilateral mastectomy.

8.  Exertional dyspnea, rule out underlying coronary ischemia.



PLAN:  We will followup echocardiogram.  Continue aero nebulizer treatment.

Continue IV steroid.  Out of bed to chair.  Awaiting Cardiology input. 

Monitor her kidney functions.  She developed leukocytosis probably

secondary to steroid.  We will request for TCU evaluation and if accepted

can be transferred to TCU.







__________________________________________

Setphani Baldwin MD





DD:  03/27/2019 12:48:24

DT:  03/27/2019 14:12:12

Job # [id number]

## 2019-03-27 NOTE — PN
DATE:  03/27/2019



PULMONARY PROGRESS NOTE



REFERRING PHYSICIAN:  Stephani Baldwin MD



SUBJECTIVE:  The patient is seen sitting in the armchair in the room,

oxygen via nasal cannula in place.  The patient reports that she is having

productive cough.  No shortness of breath at this time.  No headache,

rhinitis, chest pain, abdominal pain, nausea, vomiting, diarrhea, leg pain

or leg swelling reported.



OBJECTIVE:

GENERAL:  No acute distress.

VITAL SIGNS:  Blood pressure 120/71, pulse 92, temperature 97.7 and oxygen

saturation 92%.

HEENT:  Moist mucous membranes.

NECK:  Supple.  No JVD.

CARDIOVASCULAR:  S1 and S2 irregular.

RESPIRATORY:  Few scattered rhonchi.

ABDOMEN:  Soft and nontender.  No distention.  No organomegaly.

EXTREMITIES:  Trace bilateral lower extremity edema.

NEUROLOGIC:  Awake, alert and verbal.  Following commands.



MEDICATIONS:  Reviewed.  Eliquis 5 mg twice a day, Lipitor 40 mg at

bedtime, Pulmicort 0.5 mg inhalation every 12 hours, Klonopin 0.5 mg twice

a day p.r.n., digoxin 0.125 mg daily, Cardizem 180 mg daily, Lexapro 10 mg

daily, Neurontin 200 mg twice a day, Lasix 40 mg daily, Glucotrol 10 mg in

the morning, Humulin R sliding scale a.c. and at bedtime, Atrovent 0.5 mg

inhalation 3 times a day, Xopenex 0.63 mg inhalation every 6 hours p.r.n.,

Xopenex 0.63 mg inhalation 3 times a day, Cozaar 100 mg daily, Glucophage

500 mg twice a day, Solu-Medrol 40 mg every 12 hours, Singulair 10 mg at

bedtime, Adempas one tablet 3 times a day, Protonix 40 mg at bedtime and

potassium chloride 20 mEq at breakfast.



LABORATORY DATA:  Reviewed.  WBC 15, RBC 4.24, hemoglobin 11.8, hematocrit

37.6 and platelets 214.  Sodium 136, potassium 4.2, chloride 100, carbon

dioxide 25, anion gap 16, BUN 19, creatinine 0.5, GFR greater than 60, POC

glucose 253, random glucose 259, calcium 9.5, magnesium 1.8 and TSH 0.24. 

Procalcitonin less than 0.05.  Echocardiogram report pending.  Soft tissue

neck CT shows enlargement of the right lobe of the thyroid, small

calcification in the right lobe, thyroid nodule along the anterior surface,

left lobe is unremarkable.



IMPRESSION AND PLAN:  Asthma exacerbation, sleep apnea syndrome, pulmonary

hypertension, atrial fibrillation with rapid ventricular response, diabetes

mellitus, hypertension and hyperlipidemia, pulmonary nodules seen on CT

scan, may be radiation induced pneumonitis, chronic lung disease, inflammatory 
process.  The

patient will need followup CT scan in 3 to 4 months to follow up on stability of
nodules. 

Procalcitonin level is negative indicating not bacterial pneumonia may be

viral syndrome which triggered asthma exacerbation and cough.  We will

order swallow evaluation just to ensure no aspiration.  Aspiration

precaution, continue continuous positive airway pressure use at bedtime,

sleep apnea precaution, head of bed elevated at 45 degrees.  Continue

gastric prophylaxis.  The patient is currently on anticoagulation therapy. 

We will followup with echocardiogram results when available.  Continue

inhaled bronchodilators.



This patient was seen and examined with Dr. Amos.  Discussed assessment

and plan as described above.



This patient was seen and examined by Scott Gordon, nurse practitioner. 

Discussed assessment and plan as described above.



Thank you for this consult and we will follow with you.







__________________________________________

ANTOINE Torres





__________________________________________

Gagan Amos MD





DD:  03/27/2019 12:25:05

DT:  03/27/2019 12:49:30

Job # [id number]

PIERO

## 2019-03-27 NOTE — CP.PCM.PN
Subjective





- Date & Time of Evaluation


Date of Evaluation: 03/27/19


Time of Evaluation: 08:00





- Subjective


Subjective: 





Cali Landrum PGY2 - Heme/Onc Progress Note for Dr. Charles





Patient seen and examined this AM. Patient continues to report production of 

phlegm with some respiratory distres. 





Objective





- Vital Signs/Intake and Output


Vital Signs (last 24 hours): 


                                        











Temp Pulse Resp BP Pulse Ox


 


 97.7 F   104 H  19   124/82   92 L


 


 03/27/19 06:00  03/27/19 06:00  03/27/19 06:00  03/27/19 06:00  03/27/19 06:00








Intake and Output: 


                                        











 03/27/19 03/27/19





 06:59 18:59


 


Intake Total 300 


 


Balance 300 














- Medications


Medications: 


                               Current Medications





Apixaban (Eliquis)  2.5 mg PO BID [facility]; Protocol


   Last Admin: 03/26/19 17:08 Dose:  2.5 mg


Atorvastatin Calcium (Lipitor)  40 mg PO HS [facility]


   Last Admin: 03/26/19 21:33 Dose:  40 mg


Budesonide (Pulmicort Respules)  0.5 mg IH [id number] [facility]


   Last Admin: 03/26/19 20:24 Dose:  0.5 mg


Clonazepam (Klonopin)  0.5 mg PO BID [facility]; Protocol


   PRN Reason: Anxiety


Digoxin (Digoxin)  0.125 mg PO 1400 [facility]


   Last Admin: 03/26/19 16:00 Dose:  0.125 mg


Diltiazem HCl (Cardizem Cd)  180 mg PO DAILY [facility]


   Last Admin: 03/26/19 09:17 Dose:  180 mg


Escitalopram Oxalate (Lexapro)  10 mg PO DAILY [facility]


   Last Admin: 03/26/19 09:16 Dose:  10 mg


Gabapentin (Neurontin)  200 mg PO BID [facility]; Protocol


   Last Admin: 03/26/19 17:08 Dose:  200 mg


Glipizide (Glucotrol)  5 mg PO ACBD [facility]


Insulin Human Regular (Humulin R High)  0 units SC ACHS [facility]; Protocol


   Last Admin: 03/26/19 21:30 Dose:  Not Given


Ipratropium Bromide (Atrovent)  0.5 mg IH TIDRESP [facility]


   Last Admin: 03/26/19 20:24 Dose:  0.5 mg


Levalbuterol HCl (Xopenex)  0.63 mg IH TIDRESP [facility]


   Last Admin: 03/26/19 20:24 Dose:  0.63 mg


Levalbuterol HCl (Xopenex)  0.63 mg IH [id number] PRN


   PRN Reason: Shortness of Breath


Losartan Potassium (Cozaar)  100 mg PO DAILY [facility]


   Last Admin: 03/26/19 12:00 Dose:  100 mg


Metformin HCl (Glucophage)  500 mg PO BID [facility]


   Last Admin: 03/26/19 17:05 Dose:  500 mg


Methylprednisolone (Solu-Medrol)  40 mg IV Q12 [facility]


   Last Admin: 03/26/19 21:34 Dose:  40 mg


Montelukast Sodium (Singulair)  10 mg PO HS [facility]


   Last Admin: 03/26/19 21:33 Dose:  10 mg


Non-Formulary Medication (Riociguat [Adempas])  1 tab PO TID [facility]


Pantoprazole Sodium (Protonix Ec Tab)  40 mg PO HS [facility]


   Last Admin: 03/26/19 21:33 Dose:  40 mg











- Labs


Labs: 


                                        





                                 03/27/19 06:30 





                                 03/27/19 06:30 





                                        











PT  14.5 SECONDS (9.4-12.5)  H  03/25/19  14:35    


 


INR  1.31   03/25/19  14:35    


 


APTT  35.1 Seconds (26.9-38.3)   03/25/19  14:35    














- Head Exam


Head Exam: ATRAUMATIC, NORMOCEPHALIC





- Eye Exam


Eye Exam: EOMI, PERRL





- ENT Exam


ENT Exam: Mucous Membranes Moist





- Neck Exam


Neck Exam: Full ROM





- Respiratory Exam


Respiratory Exam: Decreased Breath Sounds, NORMAL BREATHING PATTERN





- Cardiovascular Exam


Cardiovascular Exam: REGULAR RHYTHM, +S1, +S2





- GI/Abdominal Exam


GI & Abdominal Exam: Soft, Normal Bowel Sounds.  absent: Rigid, Tenderness





- Extremities Exam


Extremities Exam: Full ROM





- Neurological Exam


Neurological Exam: Alert, Awake, Oriented x3


Additional comments: 





motor and sensory grossly intact 





- Psychiatric Exam


Psychiatric exam: Normal Affect, Normal Mood





- Skin


Skin Exam: Dry, Warm





Assessment and Plan





- Assessment and Plan (Free Text)


Assessment: 





69 yo F with PMH of osteoporosis (on prolia), pHTN, HTN, HLD, AFib (on eliquis),

 DM2, chronic low back pain, and L sided intra-ductal carcinoma (s/p L 

mastectomy in 2005, s/p chemo/XRT and s/p patient preference R mastectomy in 

2017) admitted for dyspnea likely 2/2 asthma exacerbation and/or pulmonary HTN. 

Patient noted to have thyroid mass on CT that is currently under further 

investigation with assistance from General Surgery.


Plan: 





Dyspnea


Recommend continuing optimal medical management


Patient to follow up with Dr. Amos post hospital stay 


Low suspicion that newly found lung nodules are contributing to dyspnea sx's


F/u additional pulmonology recs which include stopping duoneb and starting 

xopenex





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


No evidence of recurrent disease on prior outpatient visits


Dr. Charles has been following since 2005


Recommend continuing home eliquis for hx of AFib





Right sided Thyroid Mass


Identified on prior thyroid US


Will need biopsy, given history


General Surgery recommendations for outpatient biopsy


Awaiting further recommendations from IR, Dr Charles to speak with Dr. Ezio Tomlinson 


Soft Tissue Neck CT: Right lobe enlargement that is anterior with small 

calcification noted 





Low TSH


TSH low, Free T4 wnl , Free T3 low


Possible subclinical hypothryoidism


Further recommendations post biopsy and primary management 





Further recommendations per Dr. Charles

## 2019-03-27 NOTE — CON
DATE OF CONSULTATION:  03/27/2019



CARDIOLOGY CONSULTATION



REASON FOR CONSULTATION:  History of nonobstructive coronary artery

disease, chronic atrial fibrillation, pulmonary hypertension, admitted with

shortness of breath, cardiac evaluation and management.



BRIEF CLINICAL HISTORY:  This is a 68-year-old morbidly obese female with a

past medical history significant for hypertension, COPD, diabetes, chronic

atrial fibrillation, on Eliquis, pulmonary hypertension, history of cardiac

catheterization, nonobstructive coronary artery disease in the past,

history of breast cancer, status post mastectomy, being followed by Dr. Felder as well as Dr. Juan Ramon Batista and Dr. Charles, admitted

yesterday with shortness of breath.



PAST HISTORY:  Significant for pulmonary hypertension, morbid obesity,

diabetes, hypertension, hyperlipidemia, chronic atrial fibrillation.



PAST SURGICAL HISTORY:  History of cardiac catheterization 05/17/2018,

history of breast CA bilateral, status post mastectomy, on oral pills,

being followed by Dr. Charles.



CURRENT MEDICATIONS:  The patient is taking at home Ellipta, Trulicity,

Glucophage, Adempas for pulmonary hypertension, Singulair, Neurontin,

atorvastatin, clonazepam, Demadex, Lexapro, glipizide, Eliquis one tablet

daily, losartan one tablet daily, Cardizem, and digoxin.



ALLERGIES:  NO KNOWN DRUG ALLERGIES.



PREVIOUS CARDIAC WORKUP AS FOLLOWS:  Most recently, the patient had an

echocardiography done on 09/21/2018, read by Dr. San, and shows

normal LV function.  Left ventricular ejection fraction is within normal

limit, moderate to severe valvular aortic stenosis, mild to moderate

tricuspid regurgitation, severe pulmonary hypertension, and RV systolic

pressure calculated at 76 mmHg.  The patient had prior catheterization done

dated 05/17/2019, that revealed nonobstructive coronary artery disease,

limited only to distal, RPDA 55% stenosis, preserved LV function, ejection

fraction 55-60%, EDP was 16-18 mmHg.  Right heart catheterization revealed

RA 12, RV 48/12, PA 48/20, mean PA is 27 mmHg.  Pulmonary capillary wedge

pressure 12.  Cardiac output 4.48 liters per minute, cardiac index 2.25

liters per minute, pulmonary vascular resistance 3.34 Woods unit.  There

was addendum added on 09/21/2018 on cath report because echo report dated

09/21/2018 reported severe aortic stenosis.  Tracing reviewed from the MAC

lab at that time reported peak gradient 5-10 mmHg, consistent with mild

aortic stenosis by cath.



REVIEW OF SYSTEMS:  As per HPI.  Height of the patient is 5 feet, weight of

the patient 240 pounds, body mass index elevated at 38 kg/sq m.



PHYSICAL EXAMINATION:

VITAL SIGNS:  Temperature, afebrile; heart rate 104; blood pressure 124/82.

HEENT:  PERRLA.  Extraocular muscles are intact.

NECK:  Supple.  No carotid bruit or thyromegaly.

CHEST:  Clear to auscultation.

HEART:  S1 and S2 regular.

ABDOMEN:  Soft.

EXTREMITIES:  Clubbing and cyanosis negative.  Trace to 1 pedal edema.



EKG shows atrial fibrillation, rate of 100.



BLOOD WORKUP:  WBC 15, hemoglobin and hematocrit are 10 and 36.6, platelet

count 214.  Chemistries show sodium 130, potassium 4, chloride 100, carbon

dioxide 25, anion gap of 16.19, creatinine 0.5.



IMPRESSION:  A 68-year-old morbidly obese female with past medical history

significant for diabetes, hypertension, hyperlipidemia, chronic atrial

fibrillation, on Eliquis, severe pulmonary hypertension.  Last echo showed

moderate to severe aortic stenosis by cath dated 05/17/2018, mild aortic

stenosis pullback, 5-10 mm of gradient consistent with mild aortic

stenosis.  Though the patient had severe pulmonary hypertension by echo,

that also shows moderate pulmonary retention, the patient is on Adempas for

pulmonary hypertension as well as on Eliquis.



RECOMMENDATIONS:  Continue digoxin.  Continue Cardizem.  Continue

metformin.  Continue atorvastatin.  Continue Eliquis.  Continue diuretics

to keep negative fluid balance.  Lipid profile, TSH, hemoglobin A1c.  Also,

we will get repeat echo to assess LV function.  BNP was elevated, 257.  We

will continue gentle diuretics to keep fluid negative balance and increase

the breathing.  We will follow with you.



Further recommendations will be made after echo.  We will keep daily Lasix

40 once a day.  We will get lipid profile, TSH, hemoglobin A1c as well.  If

the kidney function gets worse, we will consider to decrease the dose.  For

now, since the patient is obese, age is 68, and renal function is normal,

we will increase Eliquis to 5 p.o. b.i.d.



Thank you Dr. Baldwin to provide us the opportunity in taking care of the

patient, Sylwia Stephenson.





__________________________________________

Gagan Erazo MD





DD:  03/27/2019 9:25:26

DT:  03/27/2019 9:30:26

[facility] # [id number]

## 2019-03-28 NOTE — CP.PCM.PN
Subjective





- Date & Time of Evaluation


Date of Evaluation: 03/28/19


Time of Evaluation: 05:58





- Subjective


Subjective: 





S:Seen because I was asked to co-sign order for Colace 100 mg PO x 1.


   States that she has been constipated.


   Last BM she has had was on Tuesday.


   Has no other c/o.


   Denies nausea/vomiting/abdominal pain.


   Medical record was reviewed.





O: VSS except heart rate little tachy.


    Obese person , not in acute distress.


    LUNGS: Normal breathing pattern.


    ABD: No obvious distension.





A:Constipation.





P: Colace 100 mg PO x 1 was ordered.





Objective





- Vital Signs/Intake and Output


Vital Signs (last 24 hours): 


                                        











Temp Pulse Resp BP Pulse Ox


 


 98.2 F   103 H  19   107/66   99 


 


 03/28/19 00:01  03/28/19 02:00  03/28/19 00:01  03/28/19 00:01  03/28/19 00:01








Intake and Output: 


                                        











 03/27/19 03/28/19





 18:59 06:59


 


Intake Total  1976


 


Output Total 1600 200


 


Balance -1600 1776














- Medications


Medications: 


                               Current Medications





Apixaban (Eliquis)  5 mg PO BID [facility]; Protocol


   Last Admin: 03/27/19 17:37 Dose:  5 mg


Atorvastatin Calcium (Lipitor)  40 mg PO HS [facility]


   Last Admin: 03/27/19 22:06 Dose:  40 mg


Budesonide (Pulmicort Respules)  0.5 mg IH [id number] [facility]


   Last Admin: 03/27/19 20:17 Dose:  0.5 mg


Clonazepam (Klonopin)  0.5 mg PO BID [facility]; Protocol


   PRN Reason: Anxiety


Digoxin (Digoxin)  0.125 mg PO 1400 [facility]


   Last Admin: 03/27/19 15:44 Dose:  0.125 mg


Diltiazem HCl (Cardizem Cd)  180 mg PO DAILY [facility]


   Last Admin: 03/27/19 09:16 Dose:  180 mg


Docusate Sodium (Colace)  100 mg PO ONCE ONE


   Stop: 03/28/19 06:01


Escitalopram Oxalate (Lexapro)  10 mg PO DAILY [facility]


   Last Admin: 03/27/19 09:16 Dose:  10 mg


Furosemide (Lasix)  40 mg IV DAILY [facility]


   Last Admin: 03/27/19 09:53 Dose:  40 mg


Gabapentin (Neurontin)  200 mg PO BID [facility]; Protocol


   Last Admin: 03/27/19 17:37 Dose:  200 mg


Glipizide (Glucotrol)  10 mg PO ACBD [facility]


   Last Admin: 03/27/19 15:44 Dose:  10 mg


Insulin Human Regular (Humulin R High)  0 units SC ACHS [facility]; Protocol


   Last Admin: 03/27/19 22:05 Dose:  Not Given


Ipratropium Bromide (Atrovent)  0.5 mg IH TIDRESP [facility]


   Last Admin: 03/27/19 20:17 Dose:  0.5 mg


Levalbuterol HCl (Xopenex)  0.63 mg IH TIDRESP [facility]


   Last Admin: 03/27/19 20:17 Dose:  0.63 mg


Levalbuterol HCl (Xopenex)  0.63 mg IH X2POCLR PRN


   PRN Reason: Shortness of Breath


Losartan Potassium (Cozaar)  100 mg PO DAILY [facility]


   Last Admin: 03/27/19 09:16 Dose:  100 mg


Metformin HCl (Glucophage)  500 mg PO BID [facility]


   Last Admin: 03/27/19 17:37 Dose:  500 mg


Methylprednisolone (Solu-Medrol)  40 mg IV Q12 [facility]


   Last Admin: 03/27/19 22:06 Dose:  40 mg


Montelukast Sodium (Singulair)  10 mg PO HS [facility]


   Last Admin: 03/27/19 22:07 Dose:  10 mg


Riociguat [Adempas] (2.5mg   (Home Med))  1 tab PO TID [facility]


   Last Admin: 03/27/19 17:38 Dose:  1 tab


Pantoprazole Sodium (Protonix Ec Tab)  40 mg PO HS [facility]


   Last Admin: 03/27/19 22:06 Dose:  40 mg


Potassium Chloride (K-Dur 20 Meq Er Tab)  20 meq PO BRK [facility]











- Labs


Labs: 


                                        





                                 03/27/19 06:30 





                                 03/27/19 06:30 





                                        











PT  14.5 SECONDS (9.4-12.5)  H  03/25/19  14:35    


 


INR  1.31   03/25/19  14:35    


 


APTT  35.1 Seconds (26.9-38.3)   03/25/19  14:35

## 2019-03-28 NOTE — CP.PCM.PN
Subjective





- Date & Time of Evaluation


Date of Evaluation: 03/28/19


Time of Evaluation: 08:25





- Subjective


Subjective: 





Cali Fayeiwona PGY2 - Heme/Onc Progress Note





Patient seen and examined this AM. Patient respiratory status continues to show 

improvement. Test results are discussed with patient and goals of care including

potential for IR consultation for thyroid mass bx discussed. Patient denies 

chest pain, abdominal pain, nausea, vomiting, fever, chills. 





Objective





- Vital Signs/Intake and Output


Vital Signs (last 24 hours): 


                                        











Temp Pulse Resp BP Pulse Ox


 


 97.9 F   98 H  18   124/75   92 L


 


 03/28/19 06:00  03/28/19 09:55  03/28/19 09:55  03/28/19 09:55  03/28/19 09:55








Intake and Output: 


                                        











 03/28/19 03/28/19





 06:59 18:59


 


Intake Total 2476 


 


Output Total 700 


 


Balance 1776 














- Medications


Medications: 


                               Current Medications





Apixaban (Eliquis)  5 mg PO BID [facility]; Protocol


   Last Admin: 03/28/19 09:52 Dose:  5 mg


Atorvastatin Calcium (Lipitor)  40 mg PO HS [facility]


   Last Admin: 03/27/19 22:06 Dose:  40 mg


Budesonide (Pulmicort Respules)  0.5 mg IH [id number] [facility]


   Last Admin: 03/28/19 07:41 Dose:  0.5 mg


Clonazepam (Klonopin)  0.5 mg PO BID [facility]; Protocol


   PRN Reason: Anxiety


Digoxin (Digoxin)  0.125 mg PO 1400 [facility]


   Last Admin: 03/27/19 15:44 Dose:  0.125 mg


Diltiazem HCl (Cardizem Cd)  180 mg PO DAILY [facility]


   Last Admin: 03/28/19 09:52 Dose:  180 mg


Escitalopram Oxalate (Lexapro)  10 mg PO DAILY [facility]


   Last Admin: 03/28/19 09:53 Dose:  10 mg


Furosemide (Lasix)  40 mg IV DAILY [facility]


   Last Admin: 03/28/19 09:54 Dose:  40 mg


Gabapentin (Neurontin)  200 mg PO BID [facility]; Protocol


   Last Admin: 03/28/19 09:53 Dose:  200 mg


Glipizide (Glucotrol)  10 mg PO ACBD [facility]


   Last Admin: 03/28/19 08:02 Dose:  10 mg


Insulin Human Regular (Humulin R High)  0 units SC ACHS [facility]; Protocol


   Last Admin: 03/28/19 08:02 Dose:  7 units


Ipratropium Bromide (Atrovent)  0.5 mg IH TIDRESP [facility]


   Last Admin: 03/28/19 07:40 Dose:  0.5 mg


Levalbuterol HCl (Xopenex)  0.63 mg IH TIDRESP [facility]


   Last Admin: 03/28/19 07:41 Dose:  0.63 mg


Levalbuterol HCl (Xopenex)  0.63 mg IH [id number] PRN


   PRN Reason: Shortness of Breath


Losartan Potassium (Cozaar)  100 mg PO DAILY [facility]


   Last Admin: 03/28/19 09:53 Dose:  100 mg


Metformin HCl (Glucophage)  500 mg PO BID [facility]


   Last Admin: 03/28/19 09:52 Dose:  500 mg


Methylprednisolone (Solu-Medrol)  40 mg IV Q12 [facility]


   Last Admin: 03/28/19 09:53 Dose:  40 mg


Montelukast Sodium (Singulair)  10 mg PO HS [facility]


   Last Admin: 03/27/19 22:07 Dose:  10 mg


Riociguat [Adempas] (2.5mg   (Home Med))  1 tab PO TID [facility]


   Last Admin: 03/28/19 09:55 Dose:  1 tab


Pantoprazole Sodium (Protonix Ec Tab)  40 mg PO HS [facility]


   Last Admin: 03/27/19 22:06 Dose:  40 mg


Potassium Chloride (K-Dur 20 Meq Er Tab)  20 meq PO BRK [facility]


   Last Admin: 03/28/19 08:02 Dose:  20 meq











- Labs


Labs: 


                                        





                                 03/28/19 06:30 





                                 03/28/19 06:30 





                                        











PT  14.5 SECONDS (9.4-12.5)  H  03/25/19  14:35    


 


INR  1.31   03/25/19  14:35    


 


APTT  35.1 Seconds (26.9-38.3)   03/25/19  14:35    














- Constitutional


Appears: Non-toxic, No Acute Distress, Older Than Stated Age





- Head Exam


Head Exam: ATRAUMATIC, NORMOCEPHALIC





- Eye Exam


Eye Exam: EOMI, PERRL





- ENT Exam


ENT Exam: Mucous Membranes Moist





- Neck Exam


Neck Exam: Full ROM





- Respiratory Exam


Respiratory Exam: Decreased Breath Sounds, NORMAL BREATHING PATTERN





- Cardiovascular Exam


Cardiovascular Exam: Irregular Rhythm, +S1, +S2





- GI/Abdominal Exam


GI & Abdominal Exam: Soft, Normal Bowel Sounds.  absent: Guarding, Rigid, 

Tenderness





- Extremities Exam


Extremities Exam: absent: Calf Tenderness, Pedal Edema





- Neurological Exam


Neurological Exam: Alert, Awake, Normal Gait, Oriented x3


Additional comments: 





motor and sensory grossly intact 





- Psychiatric Exam


Psychiatric exam: Normal Affect, Normal Mood





- Skin


Skin Exam: Dry, Warm





Assessment and Plan





- Assessment and Plan (Free Text)


Assessment: 


69 yo F with PMH of osteoporosis (on prolia), pHTN, HTN, HLD, AFib (on eliquis),

 DM2, chronic low back pain, and L sided intra-ductal carcinoma (s/p L 

mastectomy in 2005, s/p chemo/XRT and s/p patient preference R mastectomy in 20 17) admitted for dyspnea likely 2/2 asthma exacerbation and/or pulmonary HTN. 

Patient noted to have thyroid mass on CT that is currently under further 

investigation with assistance from General Surgery and IR. 








Plan: 





Dyspnea


Recommend continuing optimal medical management


Patient to follow up with Dr. Amos post hospital stay 


Low suspicion that newly found lung nodules are contributing to dyspnea sx's


F/u additional pulmonology recs which include stopping duoneb and starting 

xopenex





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


No evidence of recurrent disease on prior outpatient visits


Dr. Charles has been following since 2005


Recommend continuing home eliquis for hx of AFib





Right sided Thyroid Mass


Identified on prior thyroid US


Will need biopsy, given history


General Surgery recommendations for outpatient biopsy


Awaiting further recommendations from IR, Dr Charles to speak with Dr. Ezio Tomlinson 


Soft Tissue Neck CT: Right lobe enlargement that is anterior with small 

calcification noted 





Low TSH


TSH low, Free T4 wnl , Free T3 low


Possible subclinical hypothryoidism


Patient follows up with endocrinologist for DM2, patient instructed to follow up

 outpatient


Further recommendations post biopsy and primary management 





Further recommendations per Dr. Charles

## 2019-03-28 NOTE — PN
DATE:  03/28/2019



SUBJECTIVE:  The patient is seen sitting in the chair.  She is awake.  She

is alert.  She reports that her lower extremity edema is much improved. 

She denies any chest pain.



PHYSICAL EXAMINATION:

GENERAL:  Obese, elderly lady, sitting in chair.

VITAL SIGNS:  Blood pressure 107/67, heart rate 92, respiratory rate 20,

temperature 98.2.

HEENT:  Normocephalic, atraumatic.  Positive pallor.

NECK:  Supple, no JVD.

LUNGS:  Bilateral equal entry, bilateral equal expansion.

CARDIAC:  S1, S2, regular rate and rhythm.  No murmur, no rub.

ABDOMEN:  Obese, distended, soft, nontender, bowel sounds present.

EXTREMITIES:  Lower extremity edema.



INTAKE AND OUTPUT: 1945/1616.



LABORATORY DATA:  WBC 11, hemoglobin 11.8, hematocrit 37.5, platelets 211. 

Sodium 136, potassium 4.3, chloride 98, CO2 of 27, BUN 21, creatinine 0.4,

glucose 291, calcium 9.2, phosphorus 2.9, magnesium 1.7, A1c 7.1, albumin

4.



Urinalysis:  Yellow, clear, pH 6, specific gravity less than 1.005, no

protein, no blood.



CURRENT MEDICATIONS:  Atrovent, Cardizem  mg, losartan 100 mg,

digoxin 0.125 mg, Glucophage 500 mg b.i.d., Glucotrol 10 mg with breakfast

and dinner, potassium 20 mEq daily, Klonopin, Lasix 40 mg IV daily,

Lexapro, Lipitor, Neurontin, Protonix, Adempas, Singulair, Solu-Medrol,

Xopenex.



ASSESSMENT:

1.  History of breast cancer; left breast 2005, right breast 2017.

2.  Moderate pulmonary hypertension.

3.  Non-insulin-dependent diabetes mellitus.

4.  Hypertension.

5.  Obesity.

6.  Chronic lower extremity edema.

7.  Oliguria ?



PLAN:

1.  Follow up 24-hour urine for protein and creatinine clearance.

2.  There is no proteinuria on urinalysis.

3.  Switch Lasix to 40 mg p.o. daily.







__________________________________________

Arianna Lucia MD





DD:  03/28/2019 21:15:09

DT:  03/28/2019 22:18:05

[facility] # [id number]

## 2019-03-28 NOTE — CP.PCM.PN
Subjective





- Date & Time of Evaluation


Date of Evaluation: 03/28/19


Time of Evaluation: 06:25





- Subjective


Subjective: 





Awake, sitting side of bed, denies shortness of breath at rest





Reason for consultation and follow up:Cardiac evaluation of shortness of breath,

history of atrial fibrillation, non obstructive coronary artery disease, 

pulmonary hypertension





Seen and examined by me and Dr. Lincoln





Objective





- Vital Signs/Intake and Output


Vital Signs (last 24 hours): 


                                        











Temp Pulse Resp BP Pulse Ox


 


 97.9 F   105 H  18   107/66   93 L


 


 03/28/19 06:00  03/28/19 06:00  03/28/19 06:00  03/28/19 00:01  03/28/19 06:00








Intake and Output: 


                                        











 03/28/19 03/28/19





 06:59 18:59


 


Intake Total 2476 


 


Output Total 700 


 


Balance 1776 














- Medications


Medications: 


                               Current Medications





Apixaban (Eliquis)  5 mg PO BID [facility]; Protocol


   Last Admin: 03/27/19 17:37 Dose:  5 mg


Atorvastatin Calcium (Lipitor)  40 mg PO HS [facility]


   Last Admin: 03/27/19 22:06 Dose:  40 mg


Budesonide (Pulmicort Respules)  0.5 mg IH [id number] [facility]


   Last Admin: 03/28/19 07:41 Dose:  0.5 mg


Clonazepam (Klonopin)  0.5 mg PO BID [facility]; Protocol


   PRN Reason: Anxiety


Digoxin (Digoxin)  0.125 mg PO 1400 [facility]


   Last Admin: 03/27/19 15:44 Dose:  0.125 mg


Diltiazem HCl (Cardizem Cd)  180 mg PO DAILY [facility]


   Last Admin: 03/27/19 09:16 Dose:  180 mg


Escitalopram Oxalate (Lexapro)  10 mg PO DAILY [facility]


   Last Admin: 03/27/19 09:16 Dose:  10 mg


Furosemide (Lasix)  40 mg IV DAILY [facility]


   Last Admin: 03/27/19 09:53 Dose:  40 mg


Gabapentin (Neurontin)  200 mg PO BID [facility]; Protocol


   Last Admin: 03/27/19 17:37 Dose:  200 mg


Glipizide (Glucotrol)  10 mg PO ACBD [facility]


   Last Admin: 03/28/19 08:02 Dose:  10 mg


Insulin Human Regular (Humulin R High)  0 units SC ACHS [facility]; Protocol


   Last Admin: 03/28/19 08:02 Dose:  7 units


Ipratropium Bromide (Atrovent)  0.5 mg IH TIDRESP [facility]


   Last Admin: 03/28/19 07:40 Dose:  0.5 mg


Levalbuterol HCl (Xopenex)  0.63 mg IH TIDRESP [facility]


   Last Admin: 03/28/19 07:41 Dose:  0.63 mg


Levalbuterol HCl (Xopenex)  0.63 mg IH [id number] PRN


   PRN Reason: Shortness of Breath


Losartan Potassium (Cozaar)  100 mg PO DAILY [facility]


   Last Admin: 03/27/19 09:16 Dose:  100 mg


Metformin HCl (Glucophage)  500 mg PO BID [facility]


   Last Admin: 03/27/19 17:37 Dose:  500 mg


Methylprednisolone (Solu-Medrol)  40 mg IV Q12 [facility]


   Last Admin: 03/27/19 22:06 Dose:  40 mg


Montelukast Sodium (Singulair)  10 mg PO HS [facility]


   Last Admin: 03/27/19 22:07 Dose:  10 mg


Riociguat [Adempas] (2.5mg   (Home Med))  1 tab PO TID [facility]


   Last Admin: 03/27/19 17:38 Dose:  1 tab


Pantoprazole Sodium (Protonix Ec Tab)  40 mg PO HS [facility]


   Last Admin: 03/27/19 22:06 Dose:  40 mg


Potassium Chloride (K-Dur 20 Meq Er Tab)  20 meq PO BRK [facility]


   Last Admin: 03/28/19 08:02 Dose:  20 meq











- Labs


Labs: 


                                        





                                 03/28/19 06:30 





                                 03/28/19 06:30 





                                        











PT  14.5 SECONDS (9.4-12.5)  H  03/25/19  14:35    


 


INR  1.31   03/25/19  14:35    


 


APTT  35.1 Seconds (26.9-38.3)   03/25/19  14:35    














- Constitutional


Appears: Non-toxic, No Acute Distress





- Head Exam


Head Exam: NORMAL INSPECTION, NORMOCEPHALIC





- ENT Exam


ENT Exam: Mucous Membranes Moist





- Respiratory Exam


Respiratory Exam: Decreased Breath Sounds


Additional comments: 





shortness of breath on exertion





- Cardiovascular Exam


Cardiovascular Exam: Irregular Rhythm, +S1, +S2


Additional comments: 





Telemetry-atrial fibrillation 's





- GI/Abdominal Exam


GI & Abdominal Exam: Soft, Normal Bowel Sounds





- Extremities Exam


Extremities Exam: Full ROM





- Neurological Exam


Neurological Exam: Alert, Awake, Oriented x3





- Psychiatric Exam


Psychiatric exam: Normal Affect, Normal Mood





- Skin


Skin Exam: Dry, Normal Color, Warm





Assessment and Plan





- Assessment and Plan (Free Text)


Assessment: 








A 68 year old female morbidly obese who came in to the ER due to shortness of 

breath x 1 day. Just recently completed antibiotics for upper respiratory infe

ction.  History of chronic A-fib,on Eliquis,  pulmonary hypertension, Asthma, 

sleep apnea, breast cancer  post  mastectomy (2005,2017),follows up with Dr. Charles. hypertension, COPD, diabetes, hyperlipidemia. Cardiac cath on 5/17/18 

showed non obstructive coronary artery disease, distal R PDA diffuse disease 

55%, LVEF 55-60%. Echo was done yesterday and showed  LVEF 65%, (Atrial fib), 

mild to moderate mitral regurgitation, moderate tricuspid regurgitation, RVSP 55

mmHg, moderate pulmonary hypertension, Dilated RA/RV/LA. possible moderate to 

severe aortic stenosis. Consider MARK to evaluate severity of aotic stenosis. 

Severe pulmonary hypertension, on Adempas. Denies shortness of breath at rest, 

mild shortness of breath when walking to bathroom.











Plan: 





Awake, sitting side of bed


Denies shortness of breath at rest, 


mild shortness of breath when walking to bathroom


Controlled Atrial fibrillation, Continue Eliquis


Blood pressure controlled


On Eliquis 5 mg BID,Lipitor 40 mg daily, Digoxin 0.125 mg daily,


 Lasix 40 mg daily, Cardizem 180 mg daily, Cozaar 100 mg daily


 Solu medrol 40 mg q 12 hours, Adempas 2.5 mg TID, Kdur 20 meq daily


Continue current treatment


Continue current medications


MARK as out patient


Lifestyle modification


Weight reduction





Will follow up





Plan and treatment discussed with Dr. Lincoln

## 2019-03-28 NOTE — CON
DATE:  03/27/2019



REASON FOR CONSULTATION:  Oliguria, edema, shortness of breath.



HISTORY OF PRESENTING ILLNESS:  A 68-year-old lady admitted on 03/25/2019

with complaints of shortness of breath, chest tightness, dyspnea on

exertion, unable to speak in full sentences.  Also complained of cough,

congestion.  The patient also reported lower extremity edema.  She also

complained of decreased urine output.  At the time of presentation, her

vital signs were, blood pressure was 106/59, heart rate was 98, respiratory

rate was 18-24, temperature, she was afebrile.  Her BUN was 19, creatinine

was 0.5.  Her magnesium was 1.3.  Her hemoglobin was 12.2.  The patient was

treated with respiratory treatments, inhalers.  She was also given Lasix 40

mg IV today.  The patient reports that with the Lasix, she has made more

urine today and her lower extremity edema is better.



PAST MEDICAL AND SURGICAL HISTORY:  Morbid obesity, COPD, pulmonary

hypertension, hypertension, hyperlipidemia, atrial fibrillation, NIDDM,

left breast cancer in 2005, status post chemo and radiation, right breast

cancer in 2017, left mastectomy 2005, right mastectomy 2017.



FAMILY HISTORY:  Stomach cancer in mother, brother with renal cell CA.



SOCIAL HISTORY:  Ex-smoker, no alcohol use, no IV drug abuse.



ALLERGIES:  NO KNOWN DRUG ALLERGIES.



MEDICATIONS AT HOME:  Lipitor, metformin, Lasix, glipizide, Singulair,

Eliquis, losartan, Klonopin, vitamin D, digoxin, diltiazem.



REVIEW OF SYSTEMS:  All systems are reviewed, pertinent positives as

mentioned in the history of presenting illness, rest unremarkable.



PHYSICAL EXAMINATION:

GENERAL:  Morbidly obese lady, sitting in chair.

VITAL SIGNS:  Blood pressure 117/67, heart rate 91, respiratory rate 20,

temperature 97.6.

HEENT:  Normocephalic, atraumatic, positive pallor.

NECK:  Supple, no JVD.

LUNGS:  Bilateral equal entry, bilateral equal expansion.

CARDIAC:  S1, S2, regular rate and rhythm, no murmur, no rub.

ABDOMEN:  Obese, distended, soft, nontender, bowel sounds present.

EXTREMITIES:  1+ pitting edema of the lower extremities.

INTAKE AND OUTPUT:  1436/1600.



LABORATORY DATA:  WBC 15, hemoglobin 11.8, hematocrit 37.6, platelets 214. 

Sodium 136, potassium 3.9, chloride 99, CO2 of 21, BUN 20, creatinine 0.4,

glucose 258, calcium 9.8, magnesium 1.6.



CURRENT MEDICATIONS:  Xopenex, Solu-Medrol 40 IV every 12, Singulair,

Adempas one tablet t.i.d., Protonix 40, gabapentin 200 b.i.d., Lipitor 40,

Lexapro, Lasix 40 mg IV given today, Klonopin, K-Dur 20, Glucotrol 10

b.i.d., digoxin 0.125 daily, losartan 100, Cardizem 180, Atrovent.



ASSESSMENT:

1.  Chronic obstructive pulmonary disease/pulmonary hypertension.

2.  Hypertension.

3.  Morbid obesity.

4.  Complains of decreased urine output, edema.

5.  Non-insulin-dependent diabetes mellitus.

6.  History of breast cancer.



PLAN:

1.  Check urinalysis.

2.  Check A1c.

3.  Check 24-hour urine for protein and creatinine clearance.





__________________________________________

Arianna Lucia MD





DD:  03/27/2019 21:10:15

DT:  03/27/2019 21:16:17

[facility] # [id number]

## 2019-03-28 NOTE — PN
DATE:  03/28/2019



PULMONARY PROGRESS NOTE



REFERRING PHYSICIAN:  Dr. Stephani Baldwin.



SUBJECTIVE:  The patient is seen lying in bed.  No acute distress.  No

overnight events reported.  Reports feeling tired this morning, states she

did not sleep well last night, did use CPAP machine last night.  Reports

having some shortness of breath with exertion.  States that she still has

cough, but it is better.  No headache, rhinitis, chest pain, abdominal

pain, nausea, vomiting, diarrhea, leg pain, or leg swelling reported.



OBJECTIVE:

GENERAL:  No acute distress.

VITAL SIGNS:  Blood pressure 124/75, pulse 98, oxygen saturation 92,

temperature 97.9, and O2 saturation 92% on CPAP.

HEENT:  Moist mucous membranes.

NECK:  Supple.  No JVD.

CARDIOVASCULAR:  S1 and S2, irregular rhythm.

RESPIRATORY:  Fair airflow bilaterally.

ABDOMEN:  Soft and nontender.  No distention.  No organomegaly.

EXTREMITIES:  Trace bilateral lower extremity edema.

NEUROLOGIC:  Awake, alert and verbal.  Follows commands.



MEDICATIONS:  Reviewed.  Eliquis 5 mg twice a day, Lipitor 40 mg at

bedtime, Pulmicort 0.5 mg inhalation every 12 hours, Klonopin 0.5 mg twice

a day p.r.n., digoxin 0.125 mg daily, Cardizem 180 mg daily, Lexapro 10 mg

daily, Lasix 40 mg IV daily, Neurontin 200 mg twice a day, glipizide 10 mg

a.c., Humulin R sliding scale a.c. and at bedtime, Atrovent 0.5 mg

inhalation three times a day, Xopenex 0.63 mg inhalation every 6 hours

p.r.n., Xopenex 0.63 mg inhalation three times a day, Cozaar 100 mg daily,

Glucophage 500 mg twice a day, Solu-Medrol 40 mg every 12 hours, Singulair

10 mg at bedtime, Adempas 1 tablet three times a day, Protonix 40 mg at

bedtime, and potassium chloride 20 mEq at breakfast.



LABORATORY DATA:  Reviewed.  WBC 11.2, RBC 4.23, hemoglobin 11.8,

hematocrit 37.5, and platelets 211.  Sodium 136, potassium 4.3, chloride

98, carbon dioxide 27, anion gap 16, BUN 21, creatinine 0.4, GFR greater

than 60, POC glucose 273, random glucose 291, calcium 9.2, phosphorus 2.9,

magnesium 1.7, total bilirubin 0.3, AST 17, ALT 22, alkaline phosphatase

45, total protein 7.2, albumin 4.0, globulin 3.1 and albumin-globulin ratio

1.3.  Triglycerides 161, cholesterol 154, LDL cholesterol 85, and HDL

cholesterol 51.  Echocardiogram shows ejection fraction 65%, AFib, mitral

regurgitation, mild to moderate tricuspid regurgitation, RVSP 55, moderate

pulmonary hypertension, possible moderate to severe valvular aortic

stenosis.  No agitation or thrombus.



IMPRESSION AND PLAN:  Asthma exacerbation, sleep apnea syndrome, pulmonary

hypertension, atrial fibrillation with rapid ventricular response, diabetes

mellitus, hypertension, hyperlipidemia, pulmonary nodules on CAT scan,

possible radiation-induced pneumonitis or chronic lung disease inflammatory

process.  The patient will need followup CT scan in three to four months to

follow up on stability of nodules.  The patient's pulmonary hypertension and

heart failure secondary to valvular heart disease.  Chronic lung disease

could be contributing to shortness of breath.  Will need pulmonary function

test as outpatient so that we are able to optimize pulmonary medications. 

Continue continuous positive airway pressure use at bedtime, sleep apnea

precaution, head of bed elevated at 45 degrees, and gastric prophylaxis. 

Currently on anticoagulation therapy.  Continue inhaled bronchodilators and

fall precautions.  We will decrease Solu-Medrol to 20 mg every 12 hours.



This patient was seen and examined with Dr. Amos.  Discussed assessment

and plan as described above.



This patient was seen and examined by Scott Gordon, nurse practitioner. 

Discussed assessment and plan as described above.



Thank you for this consult.  We will follow with you.







__________________________________________

ANTOINE Torres





__________________________________________

Gagan Amos MD





DD:  03/28/2019 11:12:05

DT:  03/28/2019 11:54:07

[facility] # [id number]

PIERO

## 2019-03-28 NOTE — CP.PCM.APN
Subjective





- Date & Time of Evaluation


Date of Evaluation: 03/28/19


Time of Evaluation: 11:55





- Subjective


Subjective: 





pt seen and examined at bedside, pt reports feeling better than yesterday . less

SOB today 





Review of Systems





- Review of Systems


All systems: reviewed and no additional remarkable complaints except





Objective





- Vital Signs/Intake and Output


Vital Signs (last 24 hours): 


                                        











Temp Pulse Resp BP Pulse Ox


 


 97.9 F   120 H  18   124/75   92 L


 


 03/28/19 06:00  03/28/19 10:00  03/28/19 09:55  03/28/19 09:55  03/28/19 09:55








Intake and Output: 


                                        











 03/28/19 03/28/19





 06:59 18:59


 


Intake Total 2476 


 


Output Total 700 


 


Balance 1776 














- Medications


Medications: 


                               Current Medications





Apixaban (Eliquis)  5 mg PO BID [facility]; Protocol


   Last Admin: 03/28/19 09:52 Dose:  5 mg


Atorvastatin Calcium (Lipitor)  40 mg PO HS [facility]


   Last Admin: 03/27/19 22:06 Dose:  40 mg


Budesonide (Pulmicort Respules)  0.5 mg IH [id number] [facility]


   Last Admin: 03/28/19 07:41 Dose:  0.5 mg


Clonazepam (Klonopin)  0.5 mg PO BID PRN; Protocol


   PRN Reason: Anxiety


Digoxin (Digoxin)  0.125 mg PO 1400 [facility]


   Last Admin: 03/27/19 15:44 Dose:  0.125 mg


Diltiazem HCl (Cardizem Cd)  180 mg PO DAILY [facility]


   Last Admin: 03/28/19 09:52 Dose:  180 mg


Escitalopram Oxalate (Lexapro)  10 mg PO DAILY [facility]


   Last Admin: 03/28/19 09:53 Dose:  10 mg


Furosemide (Lasix)  40 mg IV DAILY [facility]


   Last Admin: 03/28/19 09:54 Dose:  40 mg


Gabapentin (Neurontin)  200 mg PO BID [facility]; Protocol


   Last Admin: 03/28/19 09:53 Dose:  200 mg


Glipizide (Glucotrol)  10 mg PO ACBD [facility]


   Last Admin: 03/28/19 08:02 Dose:  10 mg


Insulin Human Regular (Humulin R High)  0 units SC ACHS [facility]; Protocol


   Last Admin: 03/28/19 12:21 Dose:  4 units


Ipratropium Bromide (Atrovent)  0.5 mg IH TIDRESP [facility]


   Last Admin: 03/28/19 13:32 Dose:  0.5 mg


Levalbuterol HCl (Xopenex)  0.63 mg IH TIDRESP [facility]


   Last Admin: 03/28/19 13:32 Dose:  0.63 mg


Levalbuterol HCl (Xopenex)  0.63 mg IH [id number] PRN


   PRN Reason: Shortness of Breath


Losartan Potassium (Cozaar)  100 mg PO DAILY [facility]


   Last Admin: 03/28/19 09:53 Dose:  100 mg


Metformin HCl (Glucophage)  500 mg PO BID [facility]


   Last Admin: 03/28/19 09:52 Dose:  500 mg


Methylprednisolone (Solu-Medrol)  20 mg IV Q12 [facility]


Montelukast Sodium (Singulair)  10 mg PO HS [facility]


   Last Admin: 03/27/19 22:07 Dose:  10 mg


Riociguat [Adempas] (2.5mg   (Home Med))  1 tab PO TID [facility]


   Last Admin: 03/28/19 09:55 Dose:  1 tab


Pantoprazole Sodium (Protonix Ec Tab)  40 mg PO HS [facility]


   Last Admin: 03/27/19 22:06 Dose:  40 mg


Potassium Chloride (K-Dur 20 Meq Er Tab)  20 meq PO BRK [facility]


   Last Admin: 03/28/19 08:02 Dose:  20 meq











- Labs


Labs: 


                                        





                                 03/28/19 06:30 





                                 03/28/19 06:30 





                                        











PT  14.5 SECONDS (9.4-12.5)  H  03/25/19  14:35    


 


INR  1.31   03/25/19  14:35    


 


APTT  35.1 Seconds (26.9-38.3)   03/25/19  14:35    














- Constitutional


Appears: No Acute Distress





- Head Exam


Head Exam: NORMOCEPHALIC





- Eye Exam


Eye Exam: Normal appearance





- Cardiovascular Exam


Cardiovascular Exam: Irregular Rhythm, +S1, +S2


Additional comments: 


afib 





- Neurological Exam


Neurological Exam: Alert, Awake, Oriented x3





- Psychiatric Exam


Psychiatric exam: Normal Affect, Normal Mood





- Skin


Skin Exam: Dry, Intact





Assessment and Plan





- Assessment and Plan (Free Text)


Plan: 


ITS Impressions


                              Abnormal Lab Results











  03/27/19 03/27/19 03/27/19





  06:30 06:30 16:08


 


WBC   


 


RBC   


 


Hgb   


 


Hct   


 


MCV   


 


MCH   


 


MCHC   


 


RDW   


 


Plt Count   


 


MPV   


 


Neut % (Auto)   


 


Lymph % (Auto)   


 


Mono % (Auto)   


 


Eos % (Auto)   


 


Baso % (Auto)   


 


Lymph # (Auto)   


 


Mono # (Auto)   


 


Eos # (Auto)   


 


Baso # (Auto)   


 


Absolute Neuts (auto)   


 


Sodium   


 


Potassium   


 


Chloride   


 


Carbon Dioxide   


 


Anion Gap   


 


BUN   


 


Creatinine   


 


Est GFR ( Amer)   


 


Est GFR (Non-Af Amer)   


 


POC Glucose (mg/dL)    244 H


 


Random Glucose   


 


Hemoglobin A1c   


 


Calcium   


 


Phosphorus   


 


Magnesium   


 


Total Bilirubin   


 


AST   


 


ALT   


 


Alkaline Phosphatase   


 


Total Protein   


 


Albumin   


 


Globulin   


 


Albumin/Globulin Ratio   


 


Triglycerides   


 


Cholesterol   


 


LDL Cholesterol Direct   


 


HDL Cholesterol   


 


Free T4   0.99 


 


Free T3 pg/mL  2.43 L  


 


Urine Color   


 


Urine Appearance   


 


Urine pH   


 


Ur Specific Gravity   


 


Urine Protein   


 


Urine Glucose (UA)   


 


Urine Ketones   


 


Urine Blood   


 


Urine Nitrate   


 


Urine Bilirubin   


 


Urine Urobilinogen   


 


Ur Leukocyte Esterase   














  03/27/19 03/28/19 03/28/19





  21:17 06:00 06:30


 


WBC    11.2 H D


 


RBC    4.22


 


Hgb    11.8 L


 


Hct    37.5


 


MCV    88.9


 


MCH    28.0


 


MCHC    31.5


 


RDW    16.9 H


 


Plt Count    211


 


MPV    11.6 H


 


Neut % (Auto)    86.9 H


 


Lymph % (Auto)    10.6 L


 


Mono % (Auto)    2.4


 


Eos % (Auto)    0.0 L


 


Baso % (Auto)    0.1


 


Lymph # (Auto)    1.2


 


Mono # (Auto)    0.3


 


Eos # (Auto)    0.0


 


Baso # (Auto)    0.01


 


Absolute Neuts (auto)    9.77 H


 


Sodium   


 


Potassium   


 


Chloride   


 


Carbon Dioxide   


 


Anion Gap   


 


BUN   


 


Creatinine   


 


Est GFR ( Amer)   


 


Est GFR (Non-Af Amer)   


 


POC Glucose (mg/dL)  271 H  


 


Random Glucose   


 


Hemoglobin A1c   


 


Calcium   


 


Phosphorus   


 


Magnesium   


 


Total Bilirubin   


 


AST   


 


ALT   


 


Alkaline Phosphatase   


 


Total Protein   


 


Albumin   


 


Globulin   


 


Albumin/Globulin Ratio   


 


Triglycerides   


 


Cholesterol   


 


LDL Cholesterol Direct   


 


HDL Cholesterol   


 


Free T4   


 


Free T3 pg/mL   


 


Urine Color   Yellow 


 


Urine Appearance   Clear 


 


Urine pH   6.0 


 


Ur Specific Gravity   <= 1.005 


 


Urine Protein   Negative 


 


Urine Glucose (UA)   Negative 


 


Urine Ketones   Negative 


 


Urine Blood   Negative 


 


Urine Nitrate   Negative 


 


Urine Bilirubin   Negative 


 


Urine Urobilinogen   0.2 


 


Ur Leukocyte Esterase   Negative 














  03/28/19 03/28/19 03/28/19





  06:30 06:30 07:27


 


WBC   


 


RBC   


 


Hgb   


 


Hct   


 


MCV   


 


MCH   


 


MCHC   


 


RDW   


 


Plt Count   


 


MPV   


 


Neut % (Auto)   


 


Lymph % (Auto)   


 


Mono % (Auto)   


 


Eos % (Auto)   


 


Baso % (Auto)   


 


Lymph # (Auto)   


 


Mono # (Auto)   


 


Eos # (Auto)   


 


Baso # (Auto)   


 


Absolute Neuts (auto)   


 


Sodium  136  


 


Potassium  4.3  


 


Chloride  98  


 


Carbon Dioxide  27  


 


Anion Gap  16  


 


BUN  21  


 


Creatinine  0.4 L  


 


Est GFR ( Amer)  > 60  


 


Est GFR (Non-Af Amer)  > 60  


 


POC Glucose (mg/dL)    273 H


 


Random Glucose  291 H  


 


Hemoglobin A1c   7.1 H 


 


Calcium  9.2  


 


Phosphorus  2.9  


 


Magnesium  1.7  


 


Total Bilirubin  0.3  


 


AST  17  


 


ALT  22  


 


Alkaline Phosphatase  45  


 


Total Protein  7.2  


 


Albumin  4.0  


 


Globulin  3.1  


 


Albumin/Globulin Ratio  1.3  


 


Triglycerides  161 H  


 


Cholesterol  154  


 


LDL Cholesterol Direct  85  


 


HDL Cholesterol  51  


 


Free T4   


 


Free T3 pg/mL   


 


Urine Color   


 


Urine Appearance   


 


Urine pH   


 


Ur Specific Gravity   


 


Urine Protein   


 


Urine Glucose (UA)   


 


Urine Ketones   


 


Urine Blood   


 


Urine Nitrate   


 


Urine Bilirubin   


 


Urine Urobilinogen   


 


Ur Leukocyte Esterase   














  03/28/19





  11:17


 


WBC 


 


RBC 


 


Hgb 


 


Hct 


 


MCV 


 


MCH 


 


MCHC 


 


RDW 


 


Plt Count 


 


MPV 


 


Neut % (Auto) 


 


Lymph % (Auto) 


 


Mono % (Auto) 


 


Eos % (Auto) 


 


Baso % (Auto) 


 


Lymph # (Auto) 


 


Mono # (Auto) 


 


Eos # (Auto) 


 


Baso # (Auto) 


 


Absolute Neuts (auto) 


 


Sodium 


 


Potassium 


 


Chloride 


 


Carbon Dioxide 


 


Anion Gap 


 


BUN 


 


Creatinine 


 


Est GFR ( Amer) 


 


Est GFR (Non-Af Amer) 


 


POC Glucose (mg/dL)  211 H


 


Random Glucose 


 


Hemoglobin A1c 


 


Calcium 


 


Phosphorus 


 


Magnesium 


 


Total Bilirubin 


 


AST 


 


ALT 


 


Alkaline Phosphatase 


 


Total Protein 


 


Albumin 


 


Globulin 


 


Albumin/Globulin Ratio 


 


Triglycerides 


 


Cholesterol 


 


LDL Cholesterol Direct 


 


HDL Cholesterol 


 


Free T4 


 


Free T3 pg/mL 


 


Urine Color 


 


Urine Appearance 


 


Urine pH 


 


Ur Specific Gravity 


 


Urine Protein 


 


Urine Glucose (UA) 


 


Urine Ketones 


 


Urine Blood 


 


Urine Nitrate 


 


Urine Bilirubin 


 


Urine Urobilinogen 


 


Ur Leukocyte Esterase 











Chest X-Ray  03/25/19 13:30


IMPRESSION:


No active disease. No significant interval change compared to the 


prior examination(s).


 


 








Chest CT  03/25/19 15:38


Impression: 


 


No large central or segmental pulmonary embolus identified.  


 


Numerous scattered pulmonary nodules measuring up to 4 mm; according 


to the 2017 Fleischner criteria, the patient is high risk, an 


optional CT at 12 months is recommended. 


 


Status post bilateral mastectomy with right breast prosthesis 


present. 


 


Partially imaged thyroid gland demonstrates enlarged heterogeneous 


right and diminutive left thyroid gland with bilateral calcifications.


 


 








Soft Tissue Neck CT  03/26/19 14:40


IMPRESSION:


There is enlargement of the right lobe of the thyroid.  There is a 


small calcification in the right lobe.  There is a thyroid nodule 


along the anterior surface.  The left lobe is unremarkable.


 


 








68 yr old white female with pmh sig for afib, anxiety, bilateral breast ca s/p 

mastectomy ( left 2005, right 2017), asthma, Non-obstructive  cad, pulmonary 

hypertension admit with sob, chest tightness, XIONG now admitted with pulmonary, 

cardiology, renal , IR and heme/oncology  consultations for further evaluation. 





Pt found to have enlargement of the right lobe of the thyroid on ct neck [facility]

[facility]  [facility] and biopsy is pending.





consultatants and IDT notes and recommendations reviewed. 





 pt for continued optimization of medical therapy on IV lasix and IV steriod 

regimen which is being tapering in progress.





 Plan of care discussed in IDT rounds.





discuss with IR- pt on for thyroid biopsy tomorrow at 1pm (hold Eliquis??)





will follow clinical course 





BPCI/TIC





- BPCIA/TIC


Educated pt/family on BPCIA/CIR/Med to Bed Programs: N/A


Flyers given, including CMS Beneficiary letter: N/A


Pt/family verbalized understanding & agreed to program: N/A

## 2019-03-28 NOTE — PN
DATE:  03/28/2019



SUBJECTIVE:  The patient is a 68-year-old, seen and examined, sitting in

chair.  Complains of exertional dyspnea.  She states she goes to the

bathroom and get short of breath very easily.  Denies any chest pain.  No

fever or chills.  Eating and tolerating.



PHYSICAL EXAMINATION

VITAL SIGNS:  She is afebrile.  Pulse 120, respirations 18 and blood

pressure 124/75.

LUNGS:  Bilateral fair airflow.  Occasional rhonchi.

HEART:  S1 and S2 audible.

ABDOMEN:  Soft and nontender.  No rebound.  No guarding.

NEUROLOGIC:  The patient is awake and alert, able to communicate.

EXTREMITIES:  Bilateral legs no edema.



LABORATORY DATA:  WBC is 11.2, hemoglobin 11.8, hematocrit 37.5 and

platelets 211.  Chemistry; sodium 136, potassium 4.3, chloride 98, CO2 of

27, BUN 21, creatinine 0.4, blood sugar 211.  She had CT scan of the neck

done that shows thyroid nodule and right lobe of the thyroid, there is a

small calcification in the right lobe.  There is a thyroid nodule along the

anterior surface.  The left lobe is unremarkable.



ASSESSMENT:

1.  History of cancer of breast, status post bilateral mastectomy.

2.  Chronic obstructive pulmonary disease.

3.  Hypertension.

4.  Chronic atrial fibrillation, on Eliquis.

5.  Pulmonary hypertension.

6.  Morbid obesity.

7.  Bilateral mastectomy.

8.  Hyperlipidemia.

9.  Insulin-dependant diabetes, status post cardiac cath.  The patient has

a diffuse disease due to _____ in right coronary artery and ejection

fraction 55% to 60%, _____.



PLAN:  The patient did not have active wheezing, we will cut down steroid. 

She is currently on Atrovent and Cardizem.  She is on losartan and digoxin

and maintained her on Eliquis.  She is on glipizide 10 mg twice a day.  She

was on Lasix 40 daily and Protonix 40 daily.  Her steroid has been cut down

from 40 twice a day to 20 twice a day.  Spoke to Dr. Charles, plan is for

her to do thyroid nodule biopsy and after then she can be transferred to

TCU if accepted, otherwise she can be sent home with home therapy and she

will followup with Dr. Charles.







__________________________________________

Stephani Baldwin MD





DD:  03/28/2019 12:11:46

DT:  03/28/2019 13:15:30

[facility] # [id number]

## 2019-03-29 NOTE — PN
DATE:  03/29/2019



The patient was seen and examined.  I do agree with the note of the medical

resident.  I was involved in the plan of care.  This is coverage for Dr. Baldwin.  The patient has acute asthma exacerbation that has improved.  She

has been given nebulizer treatments.  The patient has a thyroid nodule. 

She went for biopsy of the thyroid nodule.  The patient has osteoporosis

_____.  She is going to continue with her digoxin and Cardizem for atrial

fibrillation.  She has been on Eliquis, but it is on hold for the biopsy. 

The patient is on Adempas for her pulmonary hypertension.  She has been

followed by Dr. Amos, and I appreciated his input.  The patient is on

Colace and MiraLax for her constipation.  She does have chronic back pain,

and currently her pain is controlled.







__________________________________________

Chapito Goldsmith MD





DD:  03/29/2019 19:39:58

DT:  03/29/2019 21:18:30

Job # [id number]

## 2019-03-29 NOTE — CP.PCM.PN
Subjective





- Date & Time of Evaluation


Date of Evaluation: 03/29/19


Time of Evaluation: 07:45





- Subjective


Subjective: 





Jasper Alonzo- Internal Medicine Resident- Progress Note on Behalf Dr. Goldsmith


Subjective:


Patient seen and examined at bedside. No acute events overnight. States SOB has 

improved relative to baseline. States she has not experienced bowel movement in 

2 days. Denies fever, chills, chest pain, SOB, abdominal pain, nausea, vomiting,

diarrhea, and urinary symptoms. 





12 point ROS negative except as indicated HPI





Physical Examination:


- Constitutional


Appears: Non-toxic, No Acute Distress





- Head Exam


Head Exam: ATRAUMATIC, NORMOCEPHALIC





- Eye Exam


Eye Exam: EOMI, PERRL





- ENT Exam


ENT Exam: Mucous Membranes Moist





- Neck Exam


Neck exam: Positive for: Full Rom, Normal Inspection





- Respiratory Exam


Respiratory Exam: Wheezes (end expiratory wheezes b/l).  absent: Accessory 

Muscle Use, Rales, Rhonchi, Respiratory Distress





- Cardiovascular Exam


Cardiovascular Exam: REGULAR RHYTHM, RRR, +S1, +S2.  absent: Diastolic murmur, 

Gallop, Rubs, Systolic Murmur





- GI/Abdominal Exam


GI & Abdominal Exam: Normal Bowel Sounds.  absent: Guarding, Rebound, Tenderness





- Extremities Exam


Extremities exam: no clubbing, no cyanosis





- Neurological Exam


Neurological exam: Alert, Oriented x3





- Psychiatric Exam


Psychiatric exam: Normal Affect, Normal Mood





- Skin


Skin Exam: Dry, Intact, Warm








Assessment and Plan:


Asthma Excaerbation


Pulmonary HTN


Osteoporosis (on prolia)


HTN


HLD


AFib (on eliquis)


DM2


Chronic low back pain


Chronic lower extremity edema


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


Lung nodules


Thyroid Nodule





Imaging:


3/25/2019 [facility] PE Protocol 


No large central or segmental pulmonary embolus identified.  


Numerous scattered pulmonary nodules measuring up to 4 mm; according to the 2017

Fleischner criteria, the patient is high risk, an optional CT at 12 months is 

recommended. 


Status post bilateral mastectomy with right breast prosthesis present. 


Partially imaged thyroid gland demonstrates enlarged heterogeneous right and dim

inutive left thyroid gland with bilateral calcifications





Continue losartan for htn. Continue digoxin, cardizem for atrial fibrillation. 

Eliquis on hold due to biopsy today.  Continue pulmicort, xopenox thu/prn, 

singulair, and solumedrol for SOB. Continue adempas for pulmonary hypertension. 

Pulmonology consulted appreciate recommendations.  Continue gabapentin. Continue

metformin, glipizide and ISS for treatment of her DM. Continue colace and 

miralax thu for constipation. Continue lexapro. Heme/onc consulted- appreciate 

recommendation. Follow up lung nodules in the outpatient setting. Cardiology 

consulted- appreciate recommendations. General surgery consulted- appreciate 

recommendations. IR consulted- appreciate recommendation. Scheduled for thyroid 

biopsy today. Continue lasix for chronic lower extremity edema. 





Patient case reviewed with and plan approved by attending physician, Dr. PIA montalvo. 














Objective





- Vital Signs/Intake and Output


Vital Signs (last 24 hours): 


                                        











Temp Pulse Resp BP Pulse Ox


 


 98.2 F   108 H  20   123/80   93 L


 


 03/29/19 06:00  03/29/19 06:00  03/29/19 06:00  03/29/19 06:00  03/29/19 06:00








Intake and Output: 


                                        











 03/29/19 03/29/19





 06:59 18:59


 


Intake Total 2780 


 


Output Total 2210 


 


Balance 570 














- Medications


Medications: 


                               Current Medications





Apixaban (Eliquis)  5 mg PO BID [facility]; Protocol


   Last Admin: 03/28/19 09:52 Dose:  5 mg


Atorvastatin Calcium (Lipitor)  40 mg PO HS [facility]


   Last Admin: 03/28/19 22:46 Dose:  40 mg


Budesonide (Pulmicort Respules)  0.5 mg IH [id number] [facility]


   Last Admin: 03/29/19 08:11 Dose:  0.5 mg


Clonazepam (Klonopin)  0.5 mg PO BID [facility]; Protocol


   PRN Reason: Anxiety


Digoxin (Digoxin)  0.125 mg PO 1400 [facility]


   Last Admin: 03/28/19 14:15 Dose:  0.125 mg


Diltiazem HCl (Cardizem Cd)  180 mg PO DAILY [facility]


   Last Admin: 03/28/19 09:52 Dose:  180 mg


Escitalopram Oxalate (Lexapro)  10 mg PO DAILY [facility]


   Last Admin: 03/28/19 09:53 Dose:  10 mg


Furosemide (Lasix)  40 mg PO DAILY [facility]


Gabapentin (Neurontin)  200 mg PO BID [facility]; Protocol


   Last Admin: 03/28/19 17:07 Dose:  200 mg


Glipizide (Glucotrol)  10 mg PO ACBD [facility]


   Last Admin: 03/29/19 07:59 Dose:  10 mg


Insulin Human Regular (Humulin R High)  0 units SC ACHS [facility]; Protocol


   Last Admin: 03/29/19 07:59 Dose:  4 units


Ipratropium Bromide (Atrovent)  0.5 mg IH TIDRESP [facility]


   Last Admin: 03/29/19 08:11 Dose:  0.5 mg


Levalbuterol HCl (Xopenex)  0.63 mg IH TIDRESP [facility]


   Last Admin: 03/29/19 08:11 Dose:  0.63 mg


Levalbuterol HCl (Xopenex)  0.63 mg IH [id number] PRN


   PRN Reason: Shortness of Breath


Losartan Potassium (Cozaar)  100 mg PO DAILY [facility]


   Last Admin: 03/28/19 09:53 Dose:  100 mg


Metformin HCl (Glucophage)  500 mg PO BID [facility]


   Last Admin: 03/28/19 17:08 Dose:  500 mg


Methylprednisolone (Solu-Medrol)  20 mg IV Q12 [facility]


   Last Admin: 03/28/19 22:47 Dose:  20 mg


Montelukast Sodium (Singulair)  10 mg PO HS [facility]


   Last Admin: 03/28/19 22:46 Dose:  10 mg


Riociguat [Adempas] (2.5mg   (Home Med))  1 tab PO TID [facility]


   Last Admin: 03/28/19 17:09 Dose:  1 tab


Pantoprazole Sodium (Protonix Ec Tab)  40 mg PO HS [facility]


   Last Admin: 03/28/19 22:46 Dose:  40 mg


Potassium Chloride (K-Dur 20 Meq Er Tab)  20 meq PO BRK [facility]


   Last Admin: 03/29/19 07:59 Dose:  20 meq











- Labs


Labs: 


                                        





                                 03/28/19 06:30 





                                 03/28/19 06:30 





                                        











PT  14.5 SECONDS (9.4-12.5)  H  03/25/19  14:35    


 


INR  1.31   03/25/19  14:35    


 


APTT  35.1 Seconds (26.9-38.3)   03/25/19  14:35

## 2019-03-29 NOTE — CP.PCM.PN
Subjective





- Date & Time of Evaluation


Date of Evaluation: 03/29/19


Time of Evaluation: 05:00





- Subjective


Subjective: 





S: Patient was seen for constipation.


    She had Colace for constipation yesterday which seems to have not helped 

her.


    Asks something else.


    Has no other complaints.


    Pertinent medical record was reviewed.





O:VSS.


   Obese person.


   Not in acute distress.


   LUNGS: Normal breathing pattern.





A:Constipation.





P:Lactulose as ordered.











Objective





- Vital Signs/Intake and Output


Vital Signs (last 24 hours): 


                                        











Temp Pulse Resp BP Pulse Ox


 


 98.2 F   108 H  20   123/80   93 L


 


 03/29/19 06:00  03/29/19 06:00  03/29/19 06:00  03/29/19 06:00  03/29/19 06:00








Intake and Output: 


                                        











 03/29/19 03/29/19





 06:59 18:59


 


Intake Total 2780 


 


Output Total 2210 


 


Balance 570 














- Medications


Medications: 


                               Current Medications





Apixaban (Eliquis)  5 mg PO BID [facility]; Protocol


   Last Admin: 03/28/19 09:52 Dose:  5 mg


Atorvastatin Calcium (Lipitor)  40 mg PO HS [facility]


   Last Admin: 03/28/19 22:46 Dose:  40 mg


Budesonide (Pulmicort Respules)  0.5 mg IH [id number] [facility]


   Last Admin: 03/28/19 19:39 Dose:  0.5 mg


Clonazepam (Klonopin)  0.5 mg PO BID [facility]; Protocol


   PRN Reason: Anxiety


Digoxin (Digoxin)  0.125 mg PO 1400 [facility]


   Last Admin: 03/28/19 14:15 Dose:  0.125 mg


Diltiazem HCl (Cardizem Cd)  180 mg PO DAILY [facility]


   Last Admin: 03/28/19 09:52 Dose:  180 mg


Escitalopram Oxalate (Lexapro)  10 mg PO DAILY [facility]


   Last Admin: 03/28/19 09:53 Dose:  10 mg


Furosemide (Lasix)  40 mg PO DAILY [facility]


Gabapentin (Neurontin)  200 mg PO BID [facility]; Protocol


   Last Admin: 03/28/19 17:07 Dose:  200 mg


Glipizide (Glucotrol)  10 mg PO ACBD [facility]


   Last Admin: 03/29/19 07:59 Dose:  10 mg


Insulin Human Regular ([facility] R High)  [facility]; Protocol


   Last Admin: 03/29/19 07:59 Dose:  4 units


Ipratropium Bromide (Atrovent)  0.5 mg IH TIDRESP [facility]


   Last Admin: 03/28/19 19:39 Dose:  0.5 mg


Levalbuterol HCl (Xopenex)  0.63 mg IH TIDRESP [facility]


   Last Admin: 03/28/19 19:39 Dose:  0.63 mg


Levalbuterol HCl (Xopenex)  0.63 mg IH [id number] PRN


   PRN Reason: Shortness of Breath


Losartan Potassium (Cozaar)  100 mg PO DAILY [facility]


   Last Admin: 03/28/19 09:53 Dose:  100 mg


Metformin HCl (Glucophage)  500 mg PO BID [facility]


   Last Admin: 03/28/19 17:08 Dose:  500 mg


Methylprednisolone (Solu-Medrol)  20 mg IV Q12 [facility]


   Last Admin: 03/28/19 22:47 Dose:  20 mg


Montelukast Sodium (Singulair)  10 mg PO HS [facility]


   Last Admin: 03/28/19 22:46 Dose:  10 mg


Riociguat [Adempas] (2.5mg   (Home Med))  1 tab PO TID [facility]


   Last Admin: 03/28/19 17:09 Dose:  1 tab


Pantoprazole Sodium (Protonix Ec Tab)  40 mg PO HS [facility]


   Last Admin: 03/28/19 22:46 Dose:  40 mg


Potassium Chloride (K-Dur 20 Meq Er Tab)  20 meq PO BRK [facility]


   Last Admin: 03/29/19 07:59 Dose:  20 meq











- Labs


Labs: 


                                        





                                 03/28/19 06:30 





                                 03/28/19 06:30 





                                        











PT  14.5 SECONDS (9.4-12.5)  H  03/25/19  14:35    


 


INR  1.31   03/25/19  14:35    


 


APTT  35.1 Seconds (26.9-38.3)   03/25/19  14:35

## 2019-03-29 NOTE — CP.PCM.PN
Subjective





- Date & Time of Evaluation


Date of Evaluation: 03/29/19


Time of Evaluation: 08:34





- Subjective


Subjective: 





Cali Landrum PGY2 - Heme/Onc Progress Note





No acute events reported overnight. Respirations stable, improved from 

admission. Awaiting tentative biopsy. Admits to continued phlegm production. 

Denies chest pain, abdominal pain, nausea, vomiting, fever, chills. 





Objective





- Vital Signs/Intake and Output


Vital Signs (last 24 hours): 


                                        











Temp Pulse Resp BP Pulse Ox


 


 98.2 F   108 H  20   123/80   93 L


 


 03/29/19 06:00  03/29/19 06:00  03/29/19 06:00  03/29/19 06:00  03/29/19 06:00








Intake and Output: 


                                        











 03/29/19 03/29/19





 06:59 18:59


 


Intake Total 2780 


 


Output Total 2210 


 


Balance 570 














- Medications


Medications: 


                               Current Medications





Apixaban (Eliquis)  5 mg PO BID [facility]; Protocol


   Last Admin: 03/28/19 09:52 Dose:  5 mg


Atorvastatin Calcium (Lipitor)  40 mg PO HS [facility]


   Last Admin: 03/28/19 22:46 Dose:  40 mg


Budesonide (Pulmicort Respules)  0.5 mg IH [id number] [facility]


   Last Admin: 03/29/19 08:11 Dose:  0.5 mg


Clonazepam (Klonopin)  0.5 mg PO BID [facility]; Protocol


   PRN Reason: Anxiety


Digoxin (Digoxin)  0.125 mg PO 1400 [facility]


   Last Admin: 03/28/19 14:15 Dose:  0.125 mg


Diltiazem HCl (Cardizem Cd)  180 mg PO DAILY [facility]


   Last Admin: 03/28/19 09:52 Dose:  180 mg


Escitalopram Oxalate (Lexapro)  10 mg PO DAILY [facility]


   Last Admin: 03/28/19 09:53 Dose:  10 mg


Furosemide (Lasix)  40 mg PO DAILY [facility]


Gabapentin (Neurontin)  200 mg PO BID [facility]; Protocol


   Last Admin: 03/28/19 17:07 Dose:  200 mg


Glipizide (Glucotrol)  10 mg PO ACBD [facility]


   Last Admin: 03/29/19 07:59 Dose:  10 mg


Insulin Human Regular (Humulin R High)  0 units SC ACHS [facility]; Protocol


   Last Admin: 03/29/19 07:59 Dose:  4 units


Ipratropium Bromide (Atrovent)  0.5 mg IH TIDRESP [facility]


   Last Admin: 03/29/19 08:11 Dose:  0.5 mg


Levalbuterol HCl (Xopenex)  0.63 mg IH TIDRESP [facility]


   Last Admin: 03/29/19 08:11 Dose:  0.63 mg


Levalbuterol HCl (Xopenex)  0.63 mg IH [id number] PRN


   PRN Reason: Shortness of Breath


Losartan Potassium (Cozaar)  100 mg PO DAILY [facility]


   Last Admin: 03/28/19 09:53 Dose:  100 mg


Metformin HCl (Glucophage)  500 mg PO BID [facility]


   Last Admin: 03/28/19 17:08 Dose:  500 mg


Methylprednisolone (Solu-Medrol)  20 mg IV Q12 [facility]


   Last Admin: 03/28/19 22:47 Dose:  20 mg


Montelukast Sodium (Singulair)  10 mg PO HS [facility]


   Last Admin: 03/28/19 22:46 Dose:  10 mg


Riociguat [Adempas] (2.5mg   (Home Med))  1 tab PO TID [facility]


   Last Admin: 03/28/19 17:09 Dose:  1 tab


Pantoprazole Sodium (Protonix Ec Tab)  40 mg PO HS [facility]


   Last Admin: 03/28/19 22:46 Dose:  40 mg


Potassium Chloride (K-Dur 20 Meq Er Tab)  20 meq PO BRK [facility]


   Last Admin: 03/29/19 07:59 Dose:  20 meq











- Labs


Labs: 


                                        





                                 03/28/19 06:30 





                                 03/28/19 06:30 





                                        











PT  14.5 SECONDS (9.4-12.5)  H  03/25/19  14:35    


 


INR  1.31   03/25/19  14:35    


 


APTT  35.1 Seconds (26.9-38.3)   03/25/19  14:35    














- Constitutional


Appears: No Acute Distress





- Head Exam


Head Exam: ATRAUMATIC, NORMOCEPHALIC





- Eye Exam


Eye Exam: EOMI, PERRL





- ENT Exam


ENT Exam: Mucous Membranes Moist





- Neck Exam


Neck Exam: Full ROM





- Respiratory Exam


Respiratory Exam: Decreased Breath Sounds, NORMAL BREATHING PATTERN





- Cardiovascular Exam


Cardiovascular Exam: Irregular Rhythm, +S1, +S2





- GI/Abdominal Exam


GI & Abdominal Exam: Soft, Normal Bowel Sounds.  absent: Tenderness





- Extremities Exam


Extremities Exam: absent: Calf Tenderness





- Neurological Exam


Neurological Exam: Alert, Awake, Normal Gait, Oriented x3





- Psychiatric Exam


Psychiatric exam: Normal Affect, Normal Mood





- Skin


Skin Exam: Dry, Warm





Assessment and Plan





- Assessment and Plan (Free Text)


Assessment: 





67 yo F with PMH of osteoporosis (on prolia), pHTN, HTN, HLD, AFib (on eliquis),

 DM2, chronic low back pain, and L sided intra-ductal carcinoma (s/p L 

mastectomy in 2005, s/p chemo/XRT and s/p patient preference R mastectomy in 

2017) admitted for dyspnea likely 2/2 asthma exacerbation and/or pulmonary HTN. 

Patient noted to have thyroid mass on CT that is currently under further 

investigation with assistance from General Surgery and IR. 





Plan: 





Dyspnea-improved


Recommend continuing optimal medical management


Patient to follow up with Dr. Amos post hospital stay 


Low suspicion that newly found lung nodules are contributing to dyspnea sx's


F/u additional pulmonology recs which include stopping duoneb and starting 

xopenex





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


No evidence of recurrent disease on prior outpatient visits


Dr. Charles has been following since 2005


Recommend continuing home eliquis for hx of AFib





Right sided Thyroid Mass


Identified on prior thyroid US


Will need biopsy, given history


General Surgery recommendations for outpatient biopsy


IR to preform biopsy tentatively today 


Soft Tissue Neck CT: Right lobe enlargement that is anterior with small 

calcification noted 





Low TSH


TSH low, Free T4 wnl , Free T3 low


Possible subclinical hypothryoidism


Patient follows up with endocrinologist for DM2, patient instructed to follow up

 outpatient


Further recommendations post biopsy and primary management 





Further recommendations per Dr. Charles

## 2019-03-29 NOTE — PN
DATE:  03/29/2019



PULMONARY PROGRESS NOTE



REFERRING PHYSICIAN:  Stephani Baldwin MD



SUBJECTIVE:  The patient is seen sitting in armchair in room.  No acute

distress.  No overnight events reported.  The patient is scheduled for

thyroid biopsy today.  Reports having feeling of plum in her throat, waking

up in the morning with dry nostrils due to oxygen, oxygen is currently

humidified.  The patient also states she did not have a bowel movement for

last four days, was given lactulose last night _____.  Also reports wearing

CPAP machine last night.  No headache, rhinitis, chest pain, abdominal

pain, nausea, vomiting, leg pain or leg swelling reported.  The patient

does still report shortness of breath with exertion.



OBJECTIVE:

GENERAL:  No acute distress.

VITAL SIGNS:  Blood pressure 123/80, pulse 100, temperature 98.2, and

oxygen saturation 93%.

HEENT:  Moist mucous membranes.

NECK:  Supple.  No JVD.

CARDIOVASCULAR:  S1 and S2 regular.

RESPIRATORY:  Fair airflow bilaterally.

ABDOMEN:  Soft and nontender.  No distention.  No organomegaly.

EXTREMITIES:  Trace bilateral lower extremity edema.

NEUROLOGIC:  Awake, alert and verbal.  Follows commands.



MEDICATIONS:  Reviewed.  Eliquis 5 mg twice a day on hold, Lipitor 40 mg at

bedtime, Pulmicort 0.5 mg inhalation every 12 hours, Klonopin 0.5 mg twice

a day p.r.n., digoxin 0.125 mg daily, Cardizem 180 mg daily, Lexapro 10 mg

daily, Lasix 40 mg daily, Neurontin 200 mg twice a day, glipizide 10 mg

a.c., Humulin R sliding scale a.c. and at bedtime, Atrovent 0.5 mg

inhalation 3 times a day, Xopenex 0.63 mg inhalation every 6 hours p.r.n.,

Xopenex 0.63 mg inhalation 3 times a day, Cozaar 100 mg daily, metformin

500 mg twice a day, Solu-Medrol 20 mg every 12 hours, Singulair 10 mg at

bedtime, Adempas 1 tablet 3 times a day, Protonix 40 mg at bedtime, MiraLax

17 g twice a day, and potassium chloride 20 mEq at breakfast.



LABORATORY DATA:  Reviewed.  POC glucose 210.  Urine total volume 2575. 

Urine protein 24 hour calculation _____.



IMPRESSION AND PLAN:  Asthma exacerbation, sleep apnea syndrome, pulmonary

hypertension, atrial fibrillation with rapid ventricular response, diabetes

mellitus, hypertension, hyperlipidemia, pulmonary nodules on CAT scan which

possibly could be radiation-induced pneumonitis or chronic lung disease or

inflammatory process.  We cannot rule out lung cancer.  The patient will

need followup CT scan in 3 months to assure stability of nodules.  The

patient also has diagnoses of pulmonary hypertension and heart failure

secondary to valvular heart disease.  The patient also has chronic lung

disease this could be contributing to shortness of breath.  Recommend

pulmonary function test as outpatient so we are able to optimize pulmonary

medications.  Continue continuous positive airway pressure use at bedtime,

sleep apnea precaution, head of bed elevated at 45 degrees, gastric

prophylaxis, anticoagulation therapy currently on hold for thyroid biopsy. 

Continue inhaled bronchodilators and fall precautions.  We will place the

patient on Cepacol throat lozenges for throat discomfort.  We will add

nasal saline spray.  Continue steroid dosing.  We will order Bisacodyl

suppository x1 at bedtime tonight if no bowel movements, fall precautions

and physical therapy.



This patient was seen and examined with Dr. Amos.  Discussed assessment

and plan as described above.



This patient was seen and examined by Scott Gordon, nurse practitioner. 

Discussed assessment and plan as described above.



Thank you for this consult and we will follow with you.







__________________________________________

ANTOINE Torres





__________________________________________

Gagan Amos MD





DD:  03/29/2019 10:42:18

DT:  03/29/2019 11:18:11

Job # [id number]

## 2019-03-29 NOTE — PQF
PROVIDER RESPONSE TEXT:



Mild intermittent



REVIEWER QUERY TEXT:



Asthma Specificity and Type



Asthma is documented in the Medical Record.  Please specify the type and severity of asthma and indic
ate if this is associated with exacerbation or status asthmaticus.

Such as:

-- Mild intermittent

-- Mild persistent

-- Moderate persistent

-- Severe persistent

-- Exercise induced bronchospasm

-- Cough variant asthma

-- Other, please specify



The patient's Clinical Indicators include:

Your consultation notes patient admitted with asthma exacerbation.

Please provide specificity about type and acuity of the condition in this patient.





Query created by: Taniya Mcghee on 3/27/2019 8:06 AM





Electronically signed by:  Gagan Amos MD 3/29/2019 2:13 PM

## 2019-03-29 NOTE — CP.PCM.PN
Subjective





- Date & Time of Evaluation


Date of Evaluation: 03/29/19


Time of Evaluation: 06:25





- Subjective


Subjective: 








Easily awaken, lying in  bed, denies shortness of breath 





Reason for consultation and follow up:Cardiac evaluation of shortness of breath,

history of atrial fibrillation, non obstructive coronary artery disease, 

pulmonary hypertension





Seen and examined by me and Dr. Lincoln








Objective





- Vital Signs/Intake and Output


Vital Signs (last 24 hours): 


                                        











Temp Pulse Resp BP Pulse Ox


 


 97.9 F   91 H  20   109/71   93 L


 


 03/29/19 00:01  03/29/19 02:00  03/29/19 00:01  03/29/19 00:01  03/29/19 00:01








Intake and Output: 


                                        











 03/28/19 03/29/19





 18:59 06:59


 


Intake Total  1940


 


Output Total  1660


 


Balance  280














- Medications


Medications: 


                               Current Medications





Apixaban (Eliquis)  [facility] mg PO BID [facility]; Protocol


   Last Admin: 03/28/19 09:52 Dose:  5 mg


Atorvastatin Calcium (Lipitor)  40 mg PO HS [facility]


   Last Admin: 03/28/19 22:46 Dose:  40 mg


Budesonide (Pulmicort Respules)  0.5 mg IH [id number] [facility]


   Last Admin: 03/28/19 19:39 Dose:  0.5 mg


Clonazepam (Klonopin)  0.5 mg PO BID [facility]; Protocol


   PRN Reason: Anxiety


Digoxin (Digoxin)  0.125 mg PO 1400 [facility]


   Last Admin: 03/28/19 14:15 Dose:  0.125 mg


Diltiazem HCl (Cardizem Cd)  180 mg PO DAILY [facility]


   Last Admin: 03/28/19 09:52 Dose:  180 mg


Escitalopram Oxalate (Lexapro)  10 mg PO DAILY [facility]


   Last Admin: 03/28/19 09:53 Dose:  10 mg


Furosemide (Lasix)  40 mg PO DAILY [facility]


Gabapentin (Neurontin)  200 mg PO BID [facility]; Protocol


   Last Admin: 03/28/19 17:07 Dose:  200 mg


Glipizide (Glucotrol)  10 mg PO ACBD [facility]


   Last Admin: 03/28/19 17:08 Dose:  10 mg


Insulin Human Regular (Humulin R High)  0 units SC ACHS [facility]; Protocol


   Last Admin: 03/28/19 22:47 Dose:  3 units


Ipratropium Bromide (Atrovent)  0.5 mg IH TIDRESP [facility]


   Last Admin: 03/28/19 19:39 Dose:  0.5 mg


Levalbuterol HCl (Xopenex)  0.63 mg IH TIDRESP [facility]


   Last Admin: 03/28/19 19:39 Dose:  0.63 mg


Levalbuterol HCl (Xopenex)  0.63 mg IH [id number] PRN


   PRN Reason: Shortness of Breath


Losartan Potassium (Cozaar)  100 mg PO DAILY [facility]


   Last Admin: 03/28/19 09:53 Dose:  100 mg


Metformin HCl (Glucophage)  500 mg PO BID [facility]


   Last Admin: 03/28/19 17:08 Dose:  500 mg


Methylprednisolone (Solu-Medrol)  20 mg IV Q12 [facility]


   Last Admin: 03/28/19 22:47 Dose:  20 mg


Montelukast Sodium (Singulair)  10 mg PO HS [facility]


   Last Admin: 03/28/19 22:46 Dose:  10 mg


Riociguat [Adempas] (2.5mg   (Home Med))  1 tab PO TID [facility]


   Last Admin: 03/28/19 17:09 Dose:  1 tab


Pantoprazole Sodium (Protonix Ec Tab)  40 mg PO HS [facility]


   Last Admin: 03/28/19 22:46 Dose:  40 mg


Potassium Chloride (K-Dur 20 Meq Er Tab)  20 meq PO BRK [facility]


   Last Admin: 03/28/19 08:02 Dose:  20 meq











- Labs


Labs: 


                                        





                                 03/28/19 06:30 





                                 03/28/19 06:30 





                                        











PT  14.5 SECONDS (9.4-12.5)  H  03/25/19  14:35    


 


INR  1.31   03/25/19  14:35    


 


APTT  35.1 Seconds (26.9-38.3)   03/25/19  14:35    














- Constitutional


Appears: Non-toxic, No Acute Distress





- Head Exam


Head Exam: NORMAL INSPECTION, NORMOCEPHALIC





- Eye Exam


Eye Exam: Normal appearance


Pupil Exam: NORMAL ACCOMODATION





- ENT Exam


ENT Exam: Mucous Membranes Moist, Normal Exam





- Respiratory Exam


Respiratory Exam: Decreased Breath Sounds, NORMAL BREATHING PATTERN





- Cardiovascular Exam


Cardiovascular Exam: Irregular Rhythm, +S1, +S2


Additional comments: 





telemetry atrial fibrillation





- GI/Abdominal Exam


GI & Abdominal Exam: Soft, Normal Bowel Sounds





- Extremities Exam


Extremities Exam: Full ROM, Normal Capillary Refill





- Neurological Exam


Neurological Exam: Alert, Awake, Oriented x3





- Psychiatric Exam


Psychiatric exam: Normal Affect, Normal Mood





- Skin


Skin Exam: Dry, Normal Color, Warm





Assessment and Plan





- Assessment and Plan (Free Text)


Assessment: 








A 68 year old female morbidly obese who came in to the ER due to shortness of 

breath x 1 day. Just recently completed antibiotics for upper respiratory 

infection.  History of chronic A-fib,on Eliquis,  pulmonary hypertension, 

Asthma, sleep apnea, breast cancer  post  mastectomy (2005,2017),follows up with

 Dr. Charles. hypertension, COPD, diabetes, hyperlipidemia. Cardiac cath on 

5/17/18 showed non obstructive coronary artery disease, distal R PDA diffuse 

disease 55%, LVEF 55-60%. Echo was done and showed  LVEF 65%, (Atrial fib), mild

 to moderate mitral regurgitation, moderate tricuspid regurgitation, RVSP 55 

mmHg, moderate pulmonary hypertension, Dilated RA/RV/LA. possible moderate to 

severe aortic stenosis. Consider MARK to evaluate severity of aortic stenosis. 

MARK as out patient. Severe pulmonary hypertension, on Adempas. Denies shortness 

of breath. For thyroid biopsy, cleared for procedure with moderate-high risk. No

 absolute contraindication. Eliquis was held since 3/27/19.











Plan: 





For thyroid biopsy today


Cleared with moderate to high risk


No distress, Denies shortness of breath  


Controlled Atrial fibrillation


Blood pressure controlled


On Lipitor 40 mg daily, Digoxin 0.125 mg daily,


 Lasix 40 mg daily, Cardizem 180 mg daily, Cozaar 100 mg daily


 Solu medrol 40 mg q 12 hours, Adempas 2.5 mg TID, Kdur 20 meq daily


Continue current treatment


Continue current medications


Lifestyle modification


Weight reduction





Will follow up





Plan and treatment discussed with Dr. Lincoln

## 2019-03-29 NOTE — US
PROCEDURE:  Ultrasound-guided right thyroid fine needle aspiration 

biopsy.



CLINICAL HISTORY:  Breast carcinoma.  Dominant heterogeneous right 

thyroid nodule.  Evaluate for malignancy 



PHYSICIAN(S):  Ezio Tomlinson M.D.



TECHNIQUE:

The relative risks and indications for the procedure were explained 

to the patient and consent obtained. The patient was placed supine on 

the stretcher with the neck extended and preliminary sonography of 

the thyroid performed. This reveal heterogeneous 2.5 cm hypoechoic 

nodule with punctate calcification..  



The neck was prepped and draped in the usual sterile fashion. 

Conscious sedation and monitoring were provided throughout the 

procedure by a nurse. 1% Xylocaine was used to anesthetize the skin 

and soft tissues at the access site. Three passes with a 22-gauge 

needle were performed under ultrasound guidance for fine needle 

aspiration of the 2.5 cm hypoechoic nodule calcification nodule in 

the right thyroid. The slides were reviewed by pathology and deemed 

adequate. The patient tolerated the procedure well.



IMPRESSION:

1. Ultrasound guided fine needle aspiration of a 2.5 cm heterogeneous 

nodule with punctate calcification in the right thyroid.

## 2019-03-29 NOTE — US
Date of service: 03/29/2019



PROCEDURE:  Ultrasound of the Kidneys



HISTORY:

elevated protein level



COMPARISON:

CT abdomen and pelvis with contrast 9/6/14



TECHNIQUE:

Sonogram of the kidneys.



FINDINGS:

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 

variant.  No obstructing calculus or hydronephrosis identified.  The 

measured portion of the right kidney measures approximately 10.9 x 

5.4 x 6.2 cm and left kidney 13.6 x 5.6 x 5.8 cm.



Incidental note is made of heterogeneous echogenic hepatic parenchyma 

which may be seen in setting of hepatic parenchymal disease or fatty 

infiltration. 7.6 x 7.7 cm left hepatic lobe hypoechoic region within 

the included portions left hepatic lobe. 



IMPRESSION:

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 

variant. No obstructing calculus or hydronephrosis identified. The 

measured portion of the right kidney measures approximately 10.9 x 

5.4 x 6.2 cm and left kidney 13.6 x 5.6 x 5.8 cm.



Incidental note is made of heterogeneous echogenic hepatic parenchyma 

which may be seen in setting of hepatic parenchymal disease or fatty 

infiltration.  



7.6 x 7.7 cm more hypoechoic region within the included portions left 

hepatic lobe; recommend dedicated cross-sectional imaging of the 

liver for further evaluation.

## 2019-03-29 NOTE — CP.PCM.PCO
Physician Communication Note





- Physician Communication Note


Physician Communication Note: pt seen and examined, plan for biopsy 1pm, will 

follow 





Additional Comments





- Additional Comments


Additional Comments: 





pt requesting vna, discussed with cm, pt recs for home noted.

## 2019-03-29 NOTE — PN
DATE:  03/29/2019



SUBJECTIVE:  The patient is seen sitting in chair.  She is awake.  She is

alert.  She is comfortable.  She denies any chest pain.  She denies any

shortness of breath.  Her breathing is better.



PHYSICAL EXAMINATION:

GENERAL:  Morbidly obese elderly lady sitting in chair.

VITAL SIGNS:  Blood pressure 120/80, heart rate 112, respiratory rate 20,

temperature 98.2.

HEENT:  Normocephalic, atraumatic, positive pallor.

NECK:  Supple, no JVD.

LUNGS:  Bilateral equal air entry, no rales, no rhonchi.

CARDIAC:  S1 and S2, regular rate and rhythm, no murmur, no rub.

ABDOMEN:  Obese, distended, soft, nontender, bowel sounds present.

EXTREMITIES:  Trace lower extremity edema.

INTAKE AND OUTPUT:  2780/2210.



LABORATORY DATA:  WBC 11, hemoglobin 11.8, hematocrit 37.5, platelets 211. 

No chemistry today.  A 24-hour urine showing creatinine clearance of 87

mL/minute, urine protein of 232 mg per day.



CURRENT MEDICATIONS:  Atrovent, Cardizem , losartan 100, digoxin

0.125, Eliquis 5 b.i.d., metformin 500 b.i.d., Glucotrol 10 b.i.d.,

potassium 20 mEq daily, Klonopin, Lasix 40 mg IV, Lexapro, Lipitor,

MiraLax, gabapentin, Adempas 1 tablet t.i.d., Singulair, Xopenex.



ASSESSMENT:

1.  Morbid obesity.

2.  Moderate pulmonary hypertension.

3.  History of breast cancer.

4.  Edema., likely secondary to pulmonary hypertension backflow.

5.  Non-insulin dependent diabetes mellitus.

6.  Mild proteinuria.



PLAN:

1.  Change Lasix to 40 mg p.o. daily.

2.  A 24-hour urine shows low normal creatinine clearance, minimal urine

protein.  Counseled the patient regarding importance of compliance with

medications and followup.  She is advised to restrict her fluid intake to 2

liters per day.

3.  Recommend increasing her outpatient Lasix to 40 mg p.o. daily.

4.  Continue losartan 100 mg daily for renal protection.

5.  Acceptable glycemic control at this time.

6.  Attempt weight loss.







__________________________________________

Arianna Lucia MD





DD:  03/29/2019 11:19:05

DT:  03/29/2019 11:21:33

[facility] # [id number]

## 2019-03-30 NOTE — DS
HISTORY OF PRESENT ILLNESS:  The patient is a 68-year-old female, who was

admitted to the hospital because of acute asthma exacerbation.  She has had

improvement of her symptoms.  She is on nebulizer treatment.  She has a

history of lung nodule and thyroid nodule, the thyroid nodule was biopsied

yesterday.  She was supposed to the discharged with intravenous sedation

and so she was kept into the hospital for observation.  The patient has

osteoporosis and is on Prolia.  She has hypertension, which is controlled. 

The patient is currently on losartan for hypertension.  She is going to

continue with Eliquis for her atrial fibrillation.  She is on metformin for

her diabetes type 2 as well as Glucotrol.  She has been continued on Lasix.

She has Lipitor for dyslipidemia.  The patient had a renal ultrasound that

shows evidence of a rotated horseshoe kidney.  She is supposed to be

discharged home today.



PHYSICAL EXAMINATION

VITAL SIGNS:  Temperature is 98, pulse of 95, blood pressure is 124/81,

respirations 20, O2 saturation 95%.

GENERAL:  The patient is lying in bed, flat, comfortable.

HEENT:  No oral lesion.  Anicteric sclerae.  Moist mucosa.

NECK:  No JVD, adenopathy, or thyromegaly.

CARDIOVASCULAR:  S1 and S2, regular.  No murmurs, rubs, or gallops.

LUNGS:  Clear to auscultation bilaterally.  No wheeze, rales, or rhonchi.

ABDOMEN:  Bowel sounds are positive, soft, nontender and nondistended.

EXTREMITIES:  No cyanosis, clubbing or edema.



ASSESSMENT

1.  Acute asthma exacerbation, resolved.

2.  Pulmonary hypertension.

3.  Osteoporosis.

4.  Hypertension.

5.  Dyslipidemia.

6.  Atrial fibrillation, on Eliquis.

7.  Chronic back pain.

8.  Lower extremity edema.

9.  Thyroid nodules, status post biopsy.

10.  History of intraductal carcinoma of the breast with mastectomy in 2005

and chemotherapy.

11.  A right horseshoe kidney.



PLAN:  The patient is currently comfortable.  She is going to be on

losartan for hypertension.  She is on Eliquis for anticoagulation that was

on hold because of the biopsy.  The patient is on potassium replacement. 

She is on Lasix for her edema.  She is on Lipitor for dyslipidemia.  She is

Neurontin for her neuropathy.  She is on Singulair.  She is going to be

discharged home today.



CONDITION:  Stable.



ACTIVITIES:  Increase as tolerated.







__________________________________________

Chapito Goldsmith MD





DD:  03/30/2019 5:56:51

DT:  03/30/2019 8:57:19

[facility] # [id number]

## 2019-03-30 NOTE — PN
DATE:  03/30/2019



SUBJECTIVE:  The patient is seen sitting in chair.  She is awake.  She is

alert.  She is comfortable.



PHYSICAL EXAMINATION:

GENERAL:  Obese elderly lady sitting in chair.

VITAL SIGNS:  Blood pressure 127/71, heart rate 96, respiratory rate 18,

temperature 98.3.

HEENT:  Normocephalic, atraumatic, positive pallor.

NECK:  Supple, no JVD.

LUNGS:  Bilateral equal air entry, no rales.

CARDIAC:  S1, S2, regular rate and rhythm, no murmur, no rub.

ABDOMEN:  Obese, distended, soft, nontender, bowel sounds present.

EXTREMITIES:  Trace lower extremity edema.



INTAKE AND OUTPUT:  23/30



LABORATORY DATA:  No chemistries.



CURRENT MEDICATIONS:  Atrovent, Cardizem , Cozaar 100, digoxin 0.125

daily, Dulcolax, Eliquis 5 b.i.d., Glucophage 500 b.i.d., Glucotrol 10

b.i.d., Lasix 40 mg p.o. daily, Lexapro, Lipitor, MiraLax, Neurontin,

Protonix.



ASSESSMENT:

1.  Chronic lower extremity edema.

2.  Morbid obesity.

3.  Pulmonary hypertension.

4.  History of breast cancer.

5.  Low normal creatinine clearance.

6.  _____ on the right side.



PLAN:

1.  Continue Lasix 40 mg daily

2.  Restrict p.o. fluids to 1800 mL/day.

3.  Continue metformin.

4.  Continue glyburide.

5.  Continue current antihypertensives.

6.  Continue ARB for renal protection.







__________________________________________

Arianna Lucia MD





DD:  03/30/2019 17:05:22

DT:  03/30/2019 17:06:56

[facility] # [id number]

## 2019-08-17 NOTE — CARD
--------------- APPROVED REPORT --------------





EKG Measurement

Heart [id number]

[id number]

[id number]

[id number]



<Conclusion>

Atrial fibrillation with rapid ventricular response

Abnormal ECG
--------------- APPROVED REPORT --------------





Procedure(s) performed: Complete Heart Catheterization



HISTORY

The patient is a 67 year-old female with a history of : most recent 

EF: 59%. (EF Method: Echocardiogram), previous cardiac transplant, 

peripheral vascular disease, diabetes mellitus with oral treatment , 

chronic lung disease, tobacco history() : The patient is a former 

smoker , hypertension , dyslipidemia , Morbid Obesity, GORGE, Pulmonary 

HTN, MARIA ESTHER and chest pain( chest tightness). Hx of ch. afib on Eliquis 

at home..



INDICATION

The indication(s) include : unstable angina , chest pain, atrial 

fibrillation, dyspnea.



CASE TECHNIQUE

The patient was brought electively to the Cardiac Catheterization 

Laboratory in a fasting state and was prepped and draped in a sterile 

manner. The right femoral groin was infiltrated with 2% Lidocaine 

subcutaneous anesthesia. A 6 Fr x 11 cm Agnes sheath was inserted 

into the right femoral artery without difficulty. Coronary 

angiography was performed using coronary diagnostic catheters. The 

left coronary system was accessed and visualized with a Diagnostic ,

6F JL4 CATH  CM catheter. The right coronary system was 

accessed and visualized with a Diagnostic ,6F JR 4 CATH  CM 

catheter. The left ventricle was accessed and visualized with a 6F 

PIGTAIL 145 CATH  CM catheter. Left ventricular/Aortic Valve 

gradient assessed on pullback. Left ventriculogram was performed in 

ALBERT projection. Pre-demployment femoral angiogram was performed . 

Closure device was deployed with a 6 Fr Angio-Seal without any 

complications. The patient tolerated the procedure well and there 

were no complications associated with the procedure. 



Vessel Analysis

The patient's coronary anatomy is co-dominant.



The left main coronary artery is a medium size vessel with diffuse 

calcification noted throughout this vessel and without significant 

stenosis. The left main bifurcates to the left anterior descending 

and circumflex.



The left anterior descending artery is a medium size vessel with 

diffuse calcification noted throughout this vessel and without 

significant stenosis. There is a 40-50% stenosis in the mid segment. 

The first diagonal branch is a small size vessel with diffuse 

calcification noted throughout this vessel and without significant 

stenosis. 



The circumflex artery is a medium size vessel with diffuse 

calcification noted throughout this vessel and without significant 

stenosis. The first obtuse marginal branch is a medium size vessel 

with diffuse calcification noted throughout this vessel and without 

significant stenosis. The second obtuse marginal branch is a medium 

size vessel with diffuse calcification noted throughout this vessel 

and without significant stenosis. The left posterior descending 

artery is a medium size vessel with diffuse calcification noted 

throughout this vessel and without significant stenosis. 



The right coronary artery is a medium size vessel with diffuse 

calcification noted throughout this vessel and without significant 

stenosis. The right posterior descending artery is a small size 

vessel with diffuse calcification noted throughout this vessel and 

without significant stenosis. There is a 55% stenosis in the distal 

segment. diffusely diz, but no focal stenosis. The right 

posterolateral branch is a small size vessel with diffuse 

calcification noted throughout this vessel and without significant 

stenosis. 



Left Ventricle

The left ventricle is bordrline in size with normal contractility. 

There was no cardiomyopathy. The left ventricular ejection fraction 

is estimated to be 55-60%. The left ventricular end diastolic 

pressure is 16-18 mmHg. 



Right Heart Cath Findings

The Right Atrial Pressure is 12/12/11 mmHg. The Right Ventricular 

Pressure is 48/12 mmHg. The Pulmonary Artery Pressure is 48/20 mmHg. 

mean of 27 The Pulmonary Catheter Wedge Pressure is 12 mmHg. PVR 3.34 

Wood units. The cardiac output and index were assessed using thermo 

dilution. The Cardiac Output is 4.48 L/min. The Cardiac index is 2.25 

L/min/m2.



Conclusion

Non obstructive CAD, Distal R PDA diffusely [id number]%

Preserved LV FX. EF-55-60%, GML92-64 mmof Hg.

RHC;RA-12,RV-48/12,PA-48/20 with men of 27 mmof Hg.

PCW-12, CO-4.48, CI-2.25, PVR-3.34 woods unit



Recommendations

Cardiac Rehabilitation Referral

Aggressive Medical TherapyCardiac Risk Reduction Program

Weight Loss Reduction Program

Consider adding treatment for pulmonary HTN.

Restart Metformin and Eliquis from am



CC; Nri Sorenson/ Elpidio/ VINEET Batista.
1. I was told the name of the physician that took care of my child while in the hospital.    2. I have been told about any important findings on my child's physical exam and my child's plan of care.    3. The doctor clearly explained my child's diagnosis and other possible diagnoses that were considered.    4. My child's doctor explained all the tests that were done and their results (if available). I understand that some of the test results may not be ready before we go home and I was told how I can get these results. I understand that a summary of my child's hospitalization and important test results will be shared with my child's outpatient doctor.    5. My child's doctor talked to me about what I need to do when we go home.    6. I understand what signs and symptoms to watch for. I understand what symptoms I would need to call my doctor for and/or return to the hospital.    7. I have the phone number to call the hospital for results and/or questions after I leave the hospital.